# Patient Record
Sex: FEMALE | Race: ASIAN | Employment: FULL TIME | URBAN - METROPOLITAN AREA
[De-identification: names, ages, dates, MRNs, and addresses within clinical notes are randomized per-mention and may not be internally consistent; named-entity substitution may affect disease eponyms.]

---

## 2018-05-23 ENCOUNTER — OFFICE VISIT (OUTPATIENT)
Dept: HEMATOLOGY | Age: 48
End: 2018-05-23

## 2018-05-23 ENCOUNTER — HOSPITAL ENCOUNTER (OUTPATIENT)
Dept: LAB | Age: 48
Discharge: HOME | End: 2018-05-23

## 2018-05-23 VITALS
SYSTOLIC BLOOD PRESSURE: 93 MMHG | TEMPERATURE: 98.6 F | DIASTOLIC BLOOD PRESSURE: 59 MMHG | HEART RATE: 75 BPM | OXYGEN SATURATION: 98 % | WEIGHT: 115.25 LBS | BODY MASS INDEX: 19.68 KG/M2 | HEIGHT: 64 IN

## 2018-05-23 DIAGNOSIS — B18.1 CHRONIC HEPATITIS B (HCC): Primary | ICD-10-CM

## 2018-05-23 LAB — SENTARA SPECIMEN COL,SENBCF: NORMAL

## 2018-05-23 PROCEDURE — 99001 SPECIMEN HANDLING PT-LAB: CPT | Performed by: INTERNAL MEDICINE

## 2018-05-23 RX ORDER — MOMETASONE FUROATE 50 UG/1
2 SPRAY, METERED NASAL DAILY
COMMUNITY

## 2018-05-23 RX ORDER — FLUTICASONE PROPIONATE AND SALMETEROL 100; 50 UG/1; UG/1
1 POWDER RESPIRATORY (INHALATION) EVERY 12 HOURS
COMMUNITY

## 2018-05-23 NOTE — MR AVS SNAPSHOT
[address] [address] 
[phone] Patient: Ken Nance MRN: [id number] YPD:8[birth year] Visit Information Date & Time Provider Department Dept. Phone Encounter #  
 5/23/2018  2:00 PM [facility]  [address] [id number] Follow-up Instructions Return in about 2 weeks (around 6/6/2018) for MLS. Upcoming Health Maintenance Date Due DTaP/Tdap/Td series (1 - Tdap) 2/3/1992 PAP AKA CERVICAL CYTOLOGY 6/25/2014 Influenza Age 5 to Adult 8/1/2018 Allergies as of 5/23/2018  Review Complete On: 5/23/2018 By: Jennifer Kurtz LPN Not on File Current Immunizations  Never Reviewed No immunizations on file. Not reviewed this visit You Were Diagnosed With   
  
 Codes Comments [facility] hepatitis B ([facility]    -  Primary ICD-10-CM: B18.1 ICD-9-CM: 070.32 Vitals BP Pulse Temp Height(growth percentile) Weight(growth percentile) SpO2  
 93/59 75 98.6 °F (37 °C) (Tympanic) 5' 4\" (1.626 m) 115 lb 4 oz (52.3 kg) 98% BMI Smoking Status 19.78 kg/m2 Never Smoker BMI and BSA Data Body Mass Index Body Surface Area 19.78 kg/m 2 1.54 m 2 Your Updated Medication List  
  
   
This list is accurate as of 5/23/18  3:21 PM.  Always use your most recent med list.  
  
  
  
  
 ADVAIR DISKUS 100-50 mcg/dose diskus inhaler Generic drug:  fluticasone-salmeterol Take 1 Puff by inhalation every twelve (12) hours. NASONEX 50 mcg/actuation nasal spray Generic drug:  mometasone 2 Sprays daily. We Performed the Following CBC WITH AUTOMATED DIFF [16770 CPT(R)] HCV AB W/REFLEX VERIFICATION [[id number] Custom] HEP A AB, TOTAL O081090 CPT(R)] HEP B SURFACE AG [id number] CPT(R)] HEPATIC FUNCTION PANEL [71727 CPT(R)] HEPATITIS B, QT, PCR [35527 CPT(R)] HEPATITIS BE AB [01678 CPT(R)] HEPATITIS BE AG [42444 CPT(R)] METABOLIC PANEL, BASIC [63601 CPT(R)] Follow-up Instructions Return in about 2 weeks (around 6/6/2018) for MLS. To-Do List   
 05/23/2018 Imaging:  US ABD LTD W ELASTOGRAPHY Introducing hospitals & HEALTH SERVICES! [facility] introduces [employer] patient portal. Now you can access parts of your medical record, email your doctor's office, and request medication refills online. 1. In your internet browser, go to [email or web address] 2. Click on the First Time User? Click Here link in the Sign In box. You will see the New Member Sign Up page. 3. Enter your [employer] Access Code exactly as it appears below. You will not need to use this code after youve completed the sign-up process. If you do not sign up before the expiration date, you must request a new code. · [employer] Access Code: [email or web address] Expires: 8/21/2018  3:21 PM 
 
4. Enter the last four digits of your Social Security Number (xxxx) and Date of Birth (mm/dd/yyyy) as indicated and click Submit. You will be taken to the next sign-up page. 5. Create a [employer] ID. This will be your [employer] login ID and cannot be changed, so think of one that is secure and easy to remember. 6. Create a [employer] password. You can change your password at any time. 7. Enter your Password Reset Question and Answer. This can be used at a later time if you forget your password. 8. Enter your e-mail address. You will receive e-mail notification when new information is available in [address] 9. Click Sign Up. You can now view and download portions of your medical record. 10. Click the Download Summary menu link to download a portable copy of your medical information. If you have questions, please visit the Frequently Asked Questions section of the [employer] website. Remember, [employer] is NOT to be used for urgent needs. For medical emergencies, dial 911. Now available from your iPhone and Android! Please provide this summary of care documentation to your next provider. Your primary care clinician is listed as Steve Kim. If you have any questions after today's visit, please call [phone].

## 2018-05-23 NOTE — PROGRESS NOTES
70 Tawanda Salmon MD, FACP, [place], [place]       Sami Ku, TORRIE Pearson, SEYMOURP-ESTEFANI Crump, ANDREA Mathur [facility] [address]    at [address], [address] Luis Manuel Richey  22.    [phone]    FAX: [address] [place] [address], [address]    [phone]    FAX: [phone]         Patient Care Team:  Virginia Crooks MD as PCP - General (Family Practice)  Garett Bearden NP. Fax [phone]      Problem List  Date Reviewed: 5/23/2018          Codes Class Noted    Chronic hepatitis B [facility]) ICD-10-CM: B18.1  ICD-9-CM: 070.32  5/23/2018              The clinicians listed above have asked me to see Brendon Ramandeepming in consultation regarding chronic HBV and its management. All medical records sent by the referring physicians were reviewed   including imaging studies     The patient is a 53y.o. year old  female who has tested positive for HBV while in the 1990s before she immigrate to the [place] from [facility] in 2005. Risk factors for acquiring HBV are immigration from [facility]. There was no history of acute icteric hepatitis     Imaging of the liver was not performed. An assessment of liver fibrosis with biopsy or elastography has not been performed. The patient has never received treatment for chronic HBV. The patient has no symptoms which can be attributed to the liver disorder. The patient has not experienced fatigue, pain in the right side over the liver,     The patient completes all daily activities without any functional limitations. All of the issues listed in the Assessment and Plan were discussed with the patient. All questions were answered. The patient expressed a clear understanding of the above. [facility] 87 in 4 weeks to review all data and determine the treatment plan. ASSESSMENT AND PLAN:  Chronic HBV  It has not yet been determined if this is active or inactive HBV. The degree of fibrosis has not yet been determined. Liver transaminases are elevated. Liver function is normal.  The platelet count is normal.    The level of HBV DNA has not yet been determined. The patient is not currently receiving treatment for HBV. Will perform laboratory testing to monitor liver function and degree of liver injury. This will include BMP, hepatic panel, CBC with platelet count,   Will perform serologic and virologic testing of HBV to assess need for treatment, type of treatment, likelihood of responding to treatment and the duration of therapy. Will perform serologic and virologic studies to assess for other causes of chronic liver disease. Will perform imaging of the liver with ultrasound. The need to assess liver fibrosis was discussed. Sheer wave elastography can assess liver fibrosis and determine if a patient has advanced fibrosis or cirrhosis without the need for liver biopsy. Sheer wave elastography is now available at [facility] [employer]. This will be scheduled. Treatment of other medical problems in patients with chronic liver disease  The patient was directed to continue all current medications at the current dosages. There are no contraindications for the patient to take any medications that are necessary for treatment of other medical issues. The patient does not consume alcohol daily. Normal doses of acetaminophen can be used for pain as needed. Normal doses of acetaminophen as recommended on the label are not hepatotoxic, even in patients with cirrhosis. The patient does not have cirrhosis. Normal doses of NSAIDs can be used for pain as needed.     Counseling for alcohol in patients with chronic liver disease  The patient was counseled regarding alcohol consumption and the effect of alcohol on chronic liver disease. The patient does not consume any significant amount of alcohol. Vaccinations   The need for vaccination against viral hepatitis A will be assessed with serologic and instituted as appropriate. Routine vaccinations against other bacterial and viral agents can be performed as indicated. Annual flu vaccination should be administered if indicated. Screening for Hepatocellular Carcinoma  HCC screening in patients with HBV should be performed on an annual basis when patients are under the age of 48 years. Over the age of 48 years [facility]. screening with ultrasound should be performed every 6 months. [facility]. screening has not been not been performed in many years. AFP was ordered today and ultrasound will be scheduled. ALLERGIES  No Known Allergies    MEDICATIONS  Current Outpatient Prescriptions   Medication Sig    tenofovir ALAFENAMIDE FUMARATE (VEMLIDY) 25 mg tab Take 25 mg by mouth daily.  mometasone (NASONEX) 50 mcg/actuation nasal spray 2 Sprays daily.  fluticasone-salmeterol (ADVAIR DISKUS) 100-50 mcg/dose diskus inhaler Take 1 Puff by inhalation every twelve (12) hours. No current facility-administered medications for this visit. SYSTEM REVIEW NOT RELATED TO LIVER DISEASE OR REVIEWED ABOVE:  Constitution systems: Negative for fever, chills, weight gain, weight loss. Eyes: Negative for visual changes. ENT: Negative for sore throat, painful swallowing. Respiratory: Negative for cough, hemoptysis, SOB. Cardiology: Negative for chest pain, palpitations. GI:  Negative for constipation or diarrhea. : Negative for urinary frequency, dysuria, hematuria, nocturia. Skin: Negative for rash. Hematology: Negative for easy bruising, blood clots. Musculo-skelatal: Negative for back pain, muscle pain, weakness.   Neurologic: Negative for headaches, dizziness, vertigo, memory problems not related to HE. Psychology: Negative for anxiety, depression. FAMILY HISTORY:  The father is alive and healthy. The mother is Has the following chronic diseases: HBV. SOCIAL HISTORY:  The patient is . The spouse has been checked for HBV negative. The patient has 2 children,   The children have been vaccinated for HBV. The patient has never used tobacco products. The patient has never consumed significant amounts of alcohol. The patient currently works full time in [employer]. PHYSICAL EXAMINATION:  Visit Vitals    BP 93/59    Pulse 75    Temp 98.6 °F (37 °C) (Tympanic)    Ht 5' 4\" (1.626 m)    Wt 115 lb 4 oz (52.3 kg)    SpO2 98%    BMI 19.78 kg/m2     General: No acute distress. Eyes: Sclera anicteric. ENT: No oral lesions. Thyroid normal.  Nodes: No adenopathy. Skin: No spider angiomata. No jaundice. No palmar erythema. Respiratory: Lungs clear to auscultation. Cardiovascular: Regular heart rate. No murmurs. No JVD. Abdomen: Soft non-tender. Liver size normal to percussion/palpation. Spleen not palpable. No obvious ascites. Extremities: No edema. No muscle wasting. No gross arthritic changes. Neurologic: Alert and oriented. Cranial nerves grossly intact. No asterixis.     LABORATORY STUDIES:  From 2018  AST/ALT/ALP/T Bili/ALB:  126/235/91/0.9    SEROLOGIES:  HBsurface antigen positive    LIVER HISTOLOGY:  Not available or performed    ENDOSCOPIC PROCEDURES:  Not available or performed    RADIOLOGY:  Not available or performed    OTHER TESTING:  Not available or performed        Seng Bowers MD  [facility] 13 of [address] of [facility]  4 [facility], [address] [place] [address], [address]  [phone]

## 2018-05-24 LAB
A-G RATIO,AGRAT: 1.6 RATIO (ref 1.1–2.6)
ABSOLUTE LYMPHOCYTE COUNT, 10803: 2.6 K/UL (ref 1–4.8)
ALBUMIN SERPL-MCNC: 4.4 G/DL (ref 3.5–5)
ALP SERPL-CCNC: 68 U/L (ref 25–115)
ALT SERPL-CCNC: 74 U/L (ref 5–40)
ANION GAP SERPL CALC-SCNC: 15 MMOL/L
AST SERPL W P-5'-P-CCNC: 45 U/L (ref 10–37)
BASOPHILS # BLD: 0 K/UL (ref 0–0.2)
BASOPHILS NFR BLD: 0 % (ref 0–2)
BILIRUB SERPL-MCNC: 0.5 MG/DL (ref 0.2–1.2)
BILIRUBIN, DIRECT,CBIL: <0.2 MG/DL (ref 0–0.3)
BUN SERPL-MCNC: 14 MG/DL (ref 6–22)
CALCIUM SERPL-MCNC: 9.5 MG/DL (ref 8.4–10.5)
CHLORIDE SERPL-SCNC: 101 MMOL/L (ref 98–110)
CO2 SERPL-SCNC: 25 MMOL/L (ref 20–32)
CREAT SERPL-MCNC: 0.5 MG/DL (ref 0.5–1.2)
EOSINOPHIL # BLD: 0.5 K/UL (ref 0–0.5)
EOSINOPHIL NFR BLD: 8 % (ref 0–6)
ERYTHROCYTE [DISTWIDTH] IN BLOOD BY AUTOMATED COUNT: 13.4 % (ref 10–15.5)
GFRAA, 66117: >60
GFRNA, 66118: >60
GLOBULIN,GLOB: 2.8 G/DL (ref 2–4)
GLUCOSE SERPL-MCNC: 78 MG/DL (ref 70–99)
GRANULOCYTES,GRANS: 47 % (ref 40–75)
HCT VFR BLD AUTO: 42.2 % (ref 35.1–48)
HCV AB SER IA-ACNC: NORMAL
HGB BLD-MCNC: 13.7 G/DL (ref 11.7–16)
LYMPHOCYTES, LYMLT: 39 % (ref 20–45)
MCH RBC QN AUTO: 32 PG (ref 26–34)
MCHC RBC AUTO-ENTMCNC: 33 G/DL (ref 31–36)
MCV RBC AUTO: 97 FL (ref 80–95)
MONOCYTES # BLD: 0.4 K/UL (ref 0.1–1)
MONOCYTES NFR BLD: 7 % (ref 3–12)
NEUTROPHILS # BLD AUTO: 3.1 K/UL (ref 1.8–7.7)
PLATELET # BLD AUTO: 263 K/UL (ref 140–440)
PMV BLD AUTO: 10.8 FL (ref 9–13)
POTASSIUM SERPL-SCNC: 3.9 MMOL/L (ref 3.5–5.5)
PROT SERPL-MCNC: 7.2 G/DL (ref 6.4–8.3)
RBC # BLD AUTO: 4.35 M/UL (ref 3.8–5.2)
SODIUM SERPL-SCNC: 141 MMOL/L (ref 133–145)
WBC # BLD AUTO: 6.7 K/UL (ref 4–11)

## 2018-05-25 LAB
HEP A AB, TOTAL, 006726: POSITIVE
HEP B SURFACE AG SCRN, 006510: DETECTED
HEPATITIS BE AB, 006635: NEGATIVE
HEPATITIS BE AG, 006619: POSITIVE

## 2018-05-29 LAB — HBV-PCR QN, 20501: ABNORMAL IU/ML

## 2018-06-09 ENCOUNTER — HOSPITAL ENCOUNTER (OUTPATIENT)
Dept: ULTRASOUND IMAGING | Age: 48
Discharge: HOME | End: 2018-06-09
Attending: INTERNAL MEDICINE
Payer: COMMERCIAL

## 2018-06-09 DIAGNOSIS — B18.1 CHRONIC HEPATITIS B (HCC): ICD-10-CM

## 2018-06-09 PROCEDURE — 0346T US ABD LTD W ELASTOGRAPHY: CPT

## 2018-06-13 ENCOUNTER — OFFICE VISIT (OUTPATIENT)
Dept: HEMATOLOGY | Age: 48
End: 2018-06-13

## 2018-06-13 ENCOUNTER — TELEPHONE (OUTPATIENT)
Dept: HEMATOLOGY | Age: 48
End: 2018-06-13

## 2018-06-13 VITALS
RESPIRATION RATE: 16 BRPM | TEMPERATURE: 98.1 F | WEIGHT: 115 LBS | HEART RATE: 79 BPM | DIASTOLIC BLOOD PRESSURE: 52 MMHG | SYSTOLIC BLOOD PRESSURE: 101 MMHG | OXYGEN SATURATION: 94 % | HEIGHT: 64 IN | BODY MASS INDEX: 19.63 KG/M2

## 2018-06-13 DIAGNOSIS — B18.1 CHRONIC HEPATITIS B (HCC): Primary | ICD-10-CM

## 2018-06-13 NOTE — PROGRESS NOTES
Benjamin Lozada is a 53 y.o. female      1. Have you been to the ER, urgent care clinic or hospitalized since your last visit? NO.     2. Have you seen or consulted any other health care providers outside of the [address] [place] [address] since your last visit (Include any pap smears or colon screening)?  NO            Learning Assessment 6/13/2018   PRIMARY LEARNER Patient   BARRIERS PRIMARY LEARNER NONE   CO-LEARNER CAREGIVER No   PRIMARY LANGUAGE ENGLISH   LEARNER PREFERENCE PRIMARY LISTENING   ANSWERED BY PATIENT   RELATIONSHIP SELF

## 2018-06-13 NOTE — MR AVS SNAPSHOT
[address] [address] 
[phone] Patient: Ken Nance MRN: [id number] YEUNG:[birth year] Visit Information Date & Time Provider Department Dept. Phone Encounter #  
 6/13/2018  1:45 PM MD Kuldeep Nielson[address]  Follow-up Instructions Return in about 4 weeks (around 7/11/2018) for NP. Upcoming Health Maintenance Date Due Pneumococcal 19-64 Medium Risk (1 of 1 - PPSV23) 2/3/1990 DTaP/Tdap/Td series (1 - Tdap) 2/3/1992 PAP AKA CERVICAL CYTOLOGY 6/25/2014 Influenza Age 5 to Adult 8/1/2018 Allergies as of 6/13/2018  Review Complete On: 6/13/2018 By: Saul Cole No Known Allergies Current Immunizations  Never Reviewed No immunizations on file. Not reviewed this visit Vitals BP Pulse Temp Resp Height(growth percentile) Weight(growth percentile) 101/52 (BP 1 Location: Right arm, BP Patient Position: Sitting) 79 98.1 °F (36.7 °C) (Tympanic) 16 5' 4\" (1.626 m) 115 lb (52.2 kg) LMP SpO2 BMI OB Status Smoking Status 06/09/2018 94% 19.74 kg/m2 Having regular periods Never Smoker BMI and BSA Data Body Mass Index Body Surface Area 19.74 kg/m 2 1.54 m 2 Preferred Pharmacy Pharmacy Name Phone [address] [address] [phone] Your Updated Medication List  
  
   
This list is accurate as of 6/13/18  2:36 PM.  Always use your most recent med list.  
  
  
  
  
 ADVAIR DISKUS 100-50 mcg/dose diskus inhaler Generic drug:  fluticasone-salmeterol Take 1 Puff by inhalation every twelve (12) hours. NASONEX 50 mcg/actuation nasal spray Generic drug:  mometasone 2 Sprays daily. tenofovir ALAFENAMIDE FUMARATE 25 mg Tab Commonly known as:  VEMLIDY Take 25 mg by mouth daily. Prescriptions Sent to Pharmacy Refills  
 tenofovir ALAFENAMIDE FUMARATE (VEMLIDY) 25 mg tab 3 Sig: Take 25 mg by mouth daily. Class: Normal  
 Pharmacy: [facility] DR WALESKA BOLAÑOS [address] [place], [place] [address]Jaylene KESSLER  #: [phone] Route: Oral  
  
Follow-up Instructions Return in about 4 weeks (around 7/11/2018) for NP. Introducing [facility] & HEALTH SERVICES! Kin[facility] Donohue introduces [employer] patient portal. Now you can access parts of your medical record, email your doctor's office, and request medication refills online. 1. In your internet browser, go to [email or web address] 2. Click on the First Time User? Click Here link in the Sign In box. You will see the New Member Sign Up page. 3. Enter your [employer] Access Code exactly as it appears below. You will not need to use this code after youve completed the sign-up process. If you do not sign up before the expiration date, you must request a new code. · [employer] Access Code: [email or web address] Expires: 8/21/2018  3:21 PM 
 
4. Enter the last four digits of your Social Security Number (xxxx) and Date of Birth (mm/dd/yyyy) as indicated and click Submit. You will be taken to the next sign-up page. 5. Create a [employer] ID. This will be your [employer] login ID and cannot be changed, so think of one that is secure and easy to remember. 6. Create a [employer] password. You can change your password at any time. 7. Enter your Password Reset Question and Answer. This can be used at a later time if you forget your password. 8. Enter your e-mail address. You will receive e-mail notification when new information is available in [address] 9. Click Sign Up. You can now view and download portions of your medical record. 10. Click the Download Summary menu link to download a portable copy of your medical information.  
 
If you have questions, please visit the Frequently Asked Questions section of the [employer]. Remember, [employer] is NOT to be used for urgent needs. For medical emergencies, dial 911. Now available from your iPhone and Android! Please provide this summary of care documentation to your next provider. Your primary care clinician is listed as Kathryn Flores. If you have any questions after today's visit, please call [phone].

## 2018-06-13 NOTE — TELEPHONE ENCOUNTER
Patient called stating that the medication that she was prescribed today by Dr. David Lopez is too expensive even when paired with the discount card. Told the patient that I will send a message to Dr. Alexandra Castillo nurse. Patient confirmed understanding.

## 2018-06-14 ENCOUNTER — TELEPHONE (OUTPATIENT)
Dept: HEMATOLOGY | Age: 48
End: 2018-06-14

## 2018-06-14 NOTE — TELEPHONE ENCOUNTER
Returned pts call re [place]. Informed pt I contacted her insurance company and they informed myself that the medication is covered at their specialty pharmacy. Script sent to [facility]. Pt stated she will call the in the am to inquire about shipping.

## 2018-06-14 NOTE — TELEPHONE ENCOUNTER
Patient states Freedjuan Cardinal is too expensive even w/ discount and would like another medication or help to lower cost

## 2018-06-14 NOTE — PROGRESS NOTES
70 Tawanda Salmon MD, FACP, Cite Rachel Goetz, [place] March Shankar, NP    Sil Berry, TORRIE Solis, [facility]   ANDREA Carey NP [facility] 136    at [address], [address] [address] R[address]  22.    [phone]    FAX: 126 \[facility] [place] [facility]\""    at [address], [address] [facility], [address]    [phone]    FAX: [phone]         Patient Care Team:  Yanelis Sutton MD as PCP - General (Family Practice)  John Ortiz NP. Fax [phone]      Problem List  Date Reviewed: 6/13/2018          Codes Class Noted    Chronic hepatitis B [facility]) ICD-10-CM: B18.1  ICD-9-CM: 070.32  5/23/2018              Isha Campa returns to the [facility] [place] for management of chronic HBV. The active problem list, all pertinent past medical history, medications, radiologic findings and laboratory findings related to the liver disorder were reviewed with the patient. The patient is a 53y.o. year old  female who has tested positive for HBV while in the 1990s before she immigrate to the [place] from [facility] in 2005. She has E-antigen positive active HBV DNA with HBV DNA level of 116,000 intU. The most recent imaging of the liver was Ultrasound performed in 5/2018. Results suggest chronic liver disease. Assessment of liver fibrosis was performed with sheer wave elastogrphy at THE [facility] in 5/2018. The result was 7.6 kPa which correlates with mild-moderate fibrosis. The patient has never received treatment for chronic HBV. She will be started on Vemlidy. The patient has no symptoms which can be attributed to the liver disorder.     The patient has not experienced fatigue, pain in the right side over the liver, The patient completes all daily activities without any functional limitations. All of the issues listed in the Assessment and Plan were discussed with the patient. All questions were answered. The patient expressed a clear understanding of the above. [address] in 4 weeks to assess response to therapy. ASSESSMENT AND PLAN:  Chronic HBV  She has E-antigen positive chronic active HBV. There is stage 1-2 fibrosis by elastography. Liver transaminases are elevated. Liver function is normal.  The platelet count is normal.    The level of HBV DNA is 116,000 intU  She will be started on Vemlidy 25 mg every day. Treatment of other medical problems in patients with chronic liver disease  The patient was directed to continue all current medications at the current dosages. There are no contraindications for the patient to take any medications that are necessary for treatment of other medical issues. The patient does not consume alcohol daily. Normal doses of acetaminophen can be used for pain as needed. Normal doses of acetaminophen as recommended on the label are not hepatotoxic, even in patients with cirrhosis. The patient does not have cirrhosis. Normal doses of NSAIDs can be used for pain as needed. Counseling for alcohol in patients with chronic liver disease  The patient was counseled regarding alcohol consumption and the effect of alcohol on chronic liver disease. The patient does not consume any significant amount of alcohol. Vaccinations   Vaccination for viral hepatitis A is not needed. The patient has serologic evidence of prior exposure or vaccination with immunity. Routine vaccinations against other bacterial and viral agents can be performed as indicated. Annual flu vaccination should be administered if indicated.     Screening for Hepatocellular Carcinoma  HCC screening in patients with HBV should be performed on an annual basis when patients are under the age of 48 years. Over the age of 48 years [facility] [address] screening with ultrasound should be performed every 6 months. [facility] screening has not been not been performed in many years. AFP was ordered today and ultrasound will be scheduled. ALLERGIES  No Known Allergies    MEDICATIONS  Current Outpatient Prescriptions   Medication Sig    tenofovir ALAFENAMIDE FUMARATE (VEMLIDY) 25 mg tab Take 25 mg by mouth daily.  mometasone (NASONEX) 50 mcg/actuation nasal spray 2 Sprays daily.  fluticasone-salmeterol (ADVAIR DISKUS) 100-50 mcg/dose diskus inhaler Take 1 Puff by inhalation every twelve (12) hours. No current facility-administered medications for this visit. SYSTEM REVIEW NOT RELATED TO LIVER DISEASE OR REVIEWED ABOVE:  Constitution systems: Negative for fever, chills, weight gain, weight loss. Eyes: Negative for visual changes. ENT: Negative for sore throat, painful swallowing. Respiratory: Negative for cough, hemoptysis, SOB. Cardiology: Negative for chest pain, palpitations. GI:  Negative for constipation or diarrhea. : Negative for urinary frequency, dysuria, hematuria, nocturia. Skin: Negative for rash. Hematology: Negative for easy bruising, blood clots. Musculo-skelatal: Negative for back pain, muscle pain, weakness. Neurologic: Negative for headaches, dizziness, vertigo, memory problems not related to HE. Psychology: Negative for anxiety, depression. FAMILY HISTORY:  The father is alive and healthy. The mother is Has the following chronic diseases: HBV. SOCIAL HISTORY:  The patient is . The spouse has been checked for HBV negative. The patient has 2 children,   The children have been vaccinated for HBV. The patient has never used tobacco products. The patient has never consumed significant amounts of alcohol. The patient currently works full time in [place].         PHYSICAL EXAMINATION:  Visit Vitals    /52 (BP 1 Location: Right arm, BP Patient Position: Sitting)    Pulse 79    Temp 98.1 °F (36.7 °C) (Tympanic)    Resp 16    Ht 5' 4\" (1.626 m)    Wt 115 lb (52.2 kg)    LMP 06/09/2018    SpO2 94%    BMI 19.74 kg/m2     General: No acute distress. Eyes: Sclera anicteric. ENT: No oral lesions. Thyroid normal.  Nodes: No adenopathy. Skin: No spider angiomata. No jaundice. No palmar erythema. Respiratory: Lungs clear to auscultation. Cardiovascular: Regular heart rate. No murmurs. No JVD. Abdomen: Soft non-tender. Liver size normal to percussion/palpation. Spleen not palpable. No obvious ascites. Extremities: No edema. No muscle wasting. No gross arthritic changes. Neurologic: Alert and oriented. Cranial nerves grossly intact. No asterixis. LABORATORY STUDIES:  Liver [place] [address] & Units 5/23/2018   WBC 4.0 - 11.0 K/uL 6.7   ANC 1.8 - 7.7 K/uL 3.1   HGB 11.7 - 16.0 g/dL 13.7    - 440 K/uL 263   AST 10 - 37 U/L 45 (H)   ALT 5 - 40 U/L 74 (H)   Alk Phos 25 - 115 U/L 68   Bili, Total 0.2 - 1.2 mg/dL 0.5   Bili, Direct 0.0 - 0.3 mg/dL <0.2   Albumin 3.5 - 5.0 g/dL 4.4   BUN 6 - 22 mg/dL 14   Creat 0.5 - 1.2 mg/dL 0.5   Na 133 - 145 mmol/L 141   K 3.5 - 5.5 mmol/L 3.9   Cl 98 - 110 mmol/L 101   CO2 20 - 32 mmol/L 25   Glucose 70 - 99 mg/dL 78     SEROLOGIES:  Serologies Latest Ref Rng & Units 5/23/2018   Hep A Ab, Total Negative Positive (A)   Hep B Surface Ag None Detec Detected (A)     5/2018. HBEantigen positive, anti-HBE negative, HBV ,000 intU, anti-HCV negative    LIVER HISTOLOGY:  5/2018. Sheer wave elastography performed at THE [facility]. E Range: 5.31-9.09 kPa, E Mean: 7.53 kPa, E Median: 7.71 kPa, E Std: 1.25 kPa, The results suggested a fibrosis level of F1-2. ENDOSCOPIC PROCEDURES:  Not available or performed    RADIOLOGY:  5/2018. Ultrasound of liver. Echogenic consistent with chronic liver disease. No liver mass lesions. No dilated bile ducts.   No ascites.     OTHER TESTING:  Not available or performed        MD Kevin Ball 13 of [facility]  4 [facility], [address] [place] [address] Stephanie Lozoya, [address]  [phone]

## 2018-06-14 NOTE — TELEPHONE ENCOUNTER
Returned pts call to inform her that I contacted insurance company re [facility] and they stated that it is covered but only at their specialty pharmacy. [facility]. Pt stated she will call them in the am to inquire about shipping.

## 2018-08-07 ENCOUNTER — OFFICE VISIT (OUTPATIENT)
Dept: HEMATOLOGY | Age: 48
End: 2018-08-07

## 2018-08-07 ENCOUNTER — HOSPITAL ENCOUNTER (OUTPATIENT)
Dept: LAB | Age: 48
Discharge: HOME | End: 2018-08-07

## 2018-08-07 VITALS
DIASTOLIC BLOOD PRESSURE: 71 MMHG | OXYGEN SATURATION: 95 % | WEIGHT: 117 LBS | BODY MASS INDEX: 19.97 KG/M2 | RESPIRATION RATE: 16 BRPM | HEIGHT: 64 IN | HEART RATE: 96 BPM | TEMPERATURE: 97 F | SYSTOLIC BLOOD PRESSURE: 92 MMHG

## 2018-08-07 DIAGNOSIS — B18.1 CHRONIC HEPATITIS B (HCC): Primary | ICD-10-CM

## 2018-08-07 LAB — SENTARA SPECIMEN COL,SENBCF: NORMAL

## 2018-08-07 PROCEDURE — 99001 SPECIMEN HANDLING PT-LAB: CPT | Performed by: NURSE PRACTITIONER

## 2018-08-07 NOTE — PROGRESS NOTES
1. Have you been to the ER, urgent care clinic since your last visit? Hospitalized since your last visit? No    2. Have you seen or consulted any other health care providers outside of the [facility] since your last visit? Include any pap smears or colon screening.  No

## 2018-08-07 NOTE — PROGRESS NOTES
70 Tawanda Salmon MD, FACP, Cite Gerda Sofy, [place]       Peggy Freeman NP    Chip Bi, TORRIE Ludwig, BannerP-BC   ANDREA Cassidy NP [address] [facility] [place] 136    at [address], [address] [facility]    [address] [facility], [facility].    [phone]    FAX: [address] [place] [address]    at [address], [address]    [phone]    FAX: [phone]         Patient Care Team:  Ginny Kumar MD as PCP - General (Family Practice)  Lena Murray NP. Fax [phone]      Problem List  Date Reviewed: 6/13/2018          Codes Class Noted    Chronic hepatitis B [facility]) ICD-10-CM: B18.1  ICD-9-CM: 070.32  5/23/2018              Wald[facility]ar [facility] for management of chronic HBV. The active problem list, all pertinent past medical history, medications, radiologic findings and laboratory findings related to the liver disorder were reviewed with the patient. The patient is a 53y.o. year old  female who has tested positive for HBV while in the 1990s before she immigrate to the [place] from [facility] in 2005. She has E-antigen positive active HBV DNA with HBV DNA level of 116,000 intU. The most recent imaging of the liver was Ultrasound performed in 5/2018. Results suggest chronic liver disease. Assessment of liver fibrosis was performed with sheer wave elastogrphy at THE [facility] in 5/2018. The result was 7.6 kPa which correlates with mild-moderate fibrosis. The patient has never received treatment for chronic HBV. She was started on [facility] 6/2018 and is tolerating the medication. The patient has no symptoms which can be attributed to the liver disorder.     The patient has not experienced fatigue, pain in the right side over the liver. The patient completes all daily activities without any functional limitations. ASSESSMENT AND PLAN:  Chronic HBV  She has E-antigen positive chronic active HBV. There is stage 1-2 fibrosis by elastography. Liver transaminases are elevated. Liver function is normal.  The platelet count is normal.    The level of HBV DNA is 116,000 intU  She was started on Vemlidy 25 mg daily. Treatment of other medical problems in patients with chronic liver disease  The patient was directed to continue all current medications at the current dosages. There are no contraindications for the patient to take any medications that are necessary for treatment of other medical issues. The patient does not consume alcohol daily. Normal doses of acetaminophen can be used for pain as needed. Normal doses of acetaminophen as recommended on the label are not hepatotoxic, even in patients with cirrhosis. The patient does not have cirrhosis. Normal doses of NSAIDs can be used for pain as needed. Counseling for alcohol in patients with chronic liver disease  The patient was counseled regarding alcohol consumption and the effect of alcohol on chronic liver disease. The patient does not consume any significant amount of alcohol. Vaccinations   Vaccination for viral hepatitis A is not needed. The patient has serologic evidence of prior exposure or vaccination with immunity. Routine vaccinations against other bacterial and viral agents can be performed as indicated. Annual flu vaccination should be administered if indicated. Screening for Hepatocellular Carcinoma  HCC screening in patients with HBV should be performed on an annual basis when patients are under the age of 48 years. Over the age of 48 years [address] screening with ultrasound should be performed every 6 months. [address] screening has recently been performed and does not suggest [address]   The next liver imaging study will be performed in 7/2019. AFP will be ordered. ALLERGIES  Allergies   Allergen Reactions    Aleve [Naproxen Sodium] Other (comments)    Tylenol [Acetaminophen] Other (comments)       MEDICATIONS  Current Outpatient Prescriptions   Medication Sig    tenofovir ALAFENAMIDE FUMARATE (VEMLIDY) 25 mg tab Take 25 mg by mouth daily.  mometasone (NASONEX) 50 mcg/actuation nasal spray 2 Sprays daily.  fluticasone-salmeterol (ADVAIR DISKUS) 100-50 mcg/dose diskus inhaler Take 1 Puff by inhalation every twelve (12) hours. No current facility-administered medications for this visit. SYSTEM REVIEW NOT RELATED TO LIVER DISEASE OR REVIEWED ABOVE:  Constitution systems: Negative for fever, chills, weight gain, weight loss. Eyes: Negative for visual changes. ENT: Negative for sore throat, painful swallowing. Respiratory: Negative for cough, hemoptysis, SOB. Cardiology: Negative for chest pain, palpitations. GI:  Negative for constipation or diarrhea. : Negative for urinary frequency, dysuria, hematuria, nocturia. Skin: Negative for rash. Hematology: Negative for easy bruising, blood clots. Musculo-skelatal: Negative for back pain, muscle pain, weakness. Neurologic: Negative for headaches, dizziness, vertigo, memory problems not related to HE. Psychology: Negative for anxiety, depression. FAMILY HISTORY:  The father is alive and healthy. The mother has the following chronic diseases: HBV. SOCIAL HISTORY:  The patient is . The spouse has been checked for HBV negative. The patient has 2 children,   The children have been vaccinated for HBV. The patient has never used tobacco products. The patient has never consumed significant amounts of alcohol. The patient currently works full time in [place].         PHYSICAL EXAMINATION:  Visit Vitals    BP 92/71 (BP 1 Location: Left arm, BP Patient Position: Sitting)    Pulse 96    Temp 97 °F (36.1 °C) (Tympanic)    Resp 16    Ht 5' 4\" (1.626 m)    Wt 117 lb (53.1 kg)    LMP 07/27/2018    SpO2 95%    BMI 20.08 kg/m2     General: No acute distress. Eyes: Sclera anicteric. ENT: No oral lesions. Thyroid normal.  Nodes: No adenopathy. Skin: No spider angiomata. No jaundice. No palmar erythema. Respiratory: Lungs clear to auscultation. Cardiovascular: Regular heart rate. No murmurs. No JVD. Abdomen: Soft non-tender. Liver size normal to percussion/palpation. Spleen not palpable. No obvious ascites. Extremities: No edema. No muscle wasting. No gross arthritic changes. Neurologic: Alert and oriented. Cranial nerves grossly intact. No asterixis. LABORATORY STUDIES:  Liver [place] of [address] & Units 8/7/2018 5/23/2018   WBC 4.0 - 11.0 K/uL 5.7 6.7   ANC 1.8 - 7.7 K/uL 3.1 3.1   HGB 11.7 - 16.0 g/dL 14.5 13.7    - 440 K/uL 238 263   AST 10 - 37 U/L 18 45 (H)   ALT 5 - 40 U/L 17 74 (H)   Alk Phos 25 - 115 U/L 52 68   Bili, Total 0.2 - 1.2 mg/dL 0.4 0.5   Bili, Direct 0.0 - 0.3 mg/dL <0.2 <0.2   Albumin 3.5 - 5.0 g/dL 4.3 4.4   BUN 6 - 22 mg/dL 19 14   Creat 0.5 - 1.2 mg/dL 0.4 (L) 0.5   Na 133 - 145 mmol/L 141 141   K 3.5 - 5.5 mmol/L 4.0 3.9   Cl 98 - 110 mmol/L 99 101   CO2 20 - 32 mmol/L 23 25   Glucose 70 - 99 mg/dL 84 78     SEROLOGIES:  Serologies Latest Ref Rng & Units 5/23/2018   Hep A Ab, Total Negative Positive (A)   Hep B Surface Ag None Detec Detected (A)     5/2018. HBEantigen positive, anti-HBE negative, HBV ,000 intU, anti-HCV negative    LIVER HISTOLOGY:  5/2018. Sheer wave elastography performed at THE [facility]. E Range: 5.31-9.09 kPa, E Mean: 7.53 kPa, E Median: 7.71 kPa, E Std: 1.25 kPa, The results suggested a fibrosis level of F1-2. ENDOSCOPIC PROCEDURES:  Not available or performed    RADIOLOGY:  5/2018. Ultrasound of liver. Echogenic consistent with chronic liver disease. No liver mass lesions. No dilated bile ducts. No ascites.     OTHER TESTING:  Not available or performed    FOLLOW UP:  All of the issues listed in the Assessment and Plan were discussed with the patient. All questions were answered. The patient expressed a clear understanding of the above. [address] [address] in 3 months for routine monitoring.        ANDREA Bah [address] of [place] [address]  [facility] Kaity Mendoza, [address] [place] [address] - [address]  [phone]

## 2018-08-07 NOTE — MR AVS SNAPSHOT
Merlin Laroche 
 
 
 [place] [address] 
[phone] Patient: Tobias Mcleod MRN: [id number] FHM:[id number] Visit Information Date & Time Provider Department Dept. Phone Encounter #  
 8/7/2018  2:00 PM ANDREA Campbell 13 of  [address] [id number] Follow-up Instructions Return in about 3 months (around 11/7/2018) for jc. Upcoming Health Maintenance Date Due Pneumococcal 19-64 Medium Risk (1 of 1 - PPSV23) 2/3/1990 DTaP/Tdap/Td series (1 - Tdap) 2/3/1992 PAP AKA CERVICAL CYTOLOGY 6/25/2014 Influenza Age 5 to Adult 8/1/2018 Allergies as of 8/7/2018  Review Complete On: 8/7/2018 By: Waldo Vizcaino Severity Noted Reaction Type Reactions Aleve [Naproxen Sodium]  08/07/2018    Other (comments) Tylenol [Acetaminophen]  08/07/2018    Other (comments) Current Immunizations  Never Reviewed No immunizations on file. Not reviewed this visit You Were Diagnosed With   
  
 Codes Comments Chronic hepatitis B ([facility]    -  Primary ICD-10-CM: B18.1 ICD-9-CM: 070.32 Vitals BP Pulse Temp Resp Height(growth percentile) Weight(growth percentile) 92/71 (BP 1 Location: Left arm, BP Patient Position: Sitting) 96 97 °F (36.1 °C) (Tympanic) 16 5' 4\" (1.626 m) 117 lb (53.1 kg) LMP SpO2 BMI OB Status Smoking Status 07/27/2018 95% 20.08 kg/m2 Having regular periods Never Smoker Vitals History BMI and BSA Data Body Mass Index Body Surface Area 20.08 kg/m 2 1.55 m 2 Preferred Pharmacy Pharmacy Name Phone [address] [phone] Your Updated Medication List  
  
   
This list is accurate as of 8/7/18  2:16 PM.  Always use your most recent med list.  
  
  
  
  
 ADVAIR DISKUS 100-50 mcg/dose diskus inhaler Generic drug:  fluticasone-salmeterol Take 1 Puff by inhalation every twelve (12) hours. NASONEX 50 mcg/actuation nasal spray Generic drug:  mometasone 2 Sprays daily. tenofovir ALAFENAMIDE FUMARATE 25 mg Tab Commonly known as:  VEMLIDY Take 25 mg by mouth daily. Follow-up Instructions Return in about 3 months (around 11/7/2018) for jc. To-Do List   
 08/07/2018 Lab:  CBC WITH AUTOMATED DIFF   
  
 08/07/2018 Lab:  HEPATIC FUNCTION PANEL   
  
 08/07/2018 Lab:  HEPATITIS B, QT, PCR   
  
 08/07/2018 Lab:  METABOLIC PANEL, BASIC Introducing [facility] [place] [facility]! [employer] introduces [employer]ush patient portal. Now you can access parts of your medical record, email your doctor's office, and request medication refills online. 1. In your internet browser, go to [email or web address] 2. Click on the First Time User? Click Here link in the Sign In box. You will see the New Member Sign Up page. 3. Enter your Krush Access Code exactly as it appears below. You will not need to use this code after youve completed the sign-up process. If you do not sign up before the expiration date, you must request a new code. · Krush Access Code: [email or web address] Expires: 8/21/2018  3:21 PM 
 
4. Enter the last four digits of your Social Security Number (xxxx) and Date of Birth (mm/dd/yyyy) as indicated and click Submit. You will be taken to the next sign-up page. 5. Create a A[employer]t ID. This will be your Krush login ID and cannot be changed, so think of one that is secure and easy to remember. 6. Create a Krush password. You can change your password at any time. 7. Enter your Password Reset Question and Answer. This can be used at a later time if you forget your password. 8. Enter your e-mail address. You will receive e-mail notification when new information is available in [address] 9. Click Sign Up. You can now view and download portions of your medical record. 10. Click the Download Summary menu link to download a portable copy of your medical information. If you have questions, please visit the Frequently Asked Questions section of the [employer] website. Remember, [employer]gen is NOT to be used for urgent needs. For medical emergencies, dial 911. Now available from your iPhone and Android! Please provide this summary of care documentation to your next provider. Your primary care clinician is listed as Ariadna Garcia. If you have any questions after today's visit, please call [phone].

## 2018-08-08 LAB
A-G RATIO,AGRAT: 1.7 RATIO (ref 1.1–2.6)
ABSOLUTE LYMPHOCYTE COUNT, 10803: 2.1 K/UL (ref 1–4.8)
ALBUMIN SERPL-MCNC: 4.3 G/DL (ref 3.5–5)
ALP SERPL-CCNC: 52 U/L (ref 25–115)
ALT SERPL-CCNC: 17 U/L (ref 5–40)
ANION GAP SERPL CALC-SCNC: 19 MMOL/L
AST SERPL W P-5'-P-CCNC: 18 U/L (ref 10–37)
BASOPHILS # BLD: 0 K/UL (ref 0–0.2)
BASOPHILS NFR BLD: 0 % (ref 0–2)
BILIRUB SERPL-MCNC: 0.4 MG/DL (ref 0.2–1.2)
BILIRUBIN, DIRECT,CBIL: <0.2 MG/DL (ref 0–0.3)
BUN SERPL-MCNC: 19 MG/DL (ref 6–22)
CALCIUM SERPL-MCNC: 9.3 MG/DL (ref 8.4–10.5)
CHLORIDE SERPL-SCNC: 99 MMOL/L (ref 98–110)
CO2 SERPL-SCNC: 23 MMOL/L (ref 20–32)
CREAT SERPL-MCNC: 0.4 MG/DL (ref 0.5–1.2)
EOSINOPHIL # BLD: 0.1 K/UL (ref 0–0.5)
EOSINOPHIL NFR BLD: 3 % (ref 0–6)
ERYTHROCYTE [DISTWIDTH] IN BLOOD BY AUTOMATED COUNT: 12.7 % (ref 10–15.5)
GFRAA, 66117: >60
GFRNA, 66118: >60
GLOBULIN,GLOB: 2.5 G/DL (ref 2–4)
GLUCOSE SERPL-MCNC: 84 MG/DL (ref 70–99)
GRANULOCYTES,GRANS: 55 % (ref 40–75)
HCT VFR BLD AUTO: 43.6 % (ref 35.1–48)
HGB BLD-MCNC: 14.5 G/DL (ref 11.7–16)
LYMPHOCYTES, LYMLT: 37 % (ref 20–45)
MCH RBC QN AUTO: 32 PG (ref 26–34)
MCHC RBC AUTO-ENTMCNC: 33 G/DL (ref 31–36)
MCV RBC AUTO: 97 FL (ref 80–95)
MONOCYTES # BLD: 0.3 K/UL (ref 0.1–1)
MONOCYTES NFR BLD: 5 % (ref 3–12)
NEUTROPHILS # BLD AUTO: 3.1 K/UL (ref 1.8–7.7)
PLATELET # BLD AUTO: 238 K/UL (ref 140–440)
PMV BLD AUTO: 10.6 FL (ref 9–13)
POTASSIUM SERPL-SCNC: 4 MMOL/L (ref 3.5–5.5)
PROT SERPL-MCNC: 6.8 G/DL (ref 6.4–8.3)
RBC # BLD AUTO: 4.52 M/UL (ref 3.8–5.2)
SODIUM SERPL-SCNC: 141 MMOL/L (ref 133–145)
WBC # BLD AUTO: 5.7 K/UL (ref 4–11)

## 2018-08-10 LAB
HBV LOG10: 2.26 {LOG_IU}/ML
HBV-PCR QN, 20501: 184 IU/ML

## 2018-08-13 NOTE — PROGRESS NOTES
HBV is down to 184 IU/mL. Liver enzymes have returned to normal. Continue Vemlidy. Follow up as scheduled.

## 2018-11-07 ENCOUNTER — HOSPITAL ENCOUNTER (OUTPATIENT)
Dept: LAB | Age: 48
Discharge: HOME | End: 2018-11-07

## 2018-11-07 ENCOUNTER — OFFICE VISIT (OUTPATIENT)
Dept: HEMATOLOGY | Age: 48
End: 2018-11-07

## 2018-11-07 VITALS
OXYGEN SATURATION: 98 % | HEART RATE: 74 BPM | HEIGHT: 64 IN | RESPIRATION RATE: 16 BRPM | DIASTOLIC BLOOD PRESSURE: 62 MMHG | TEMPERATURE: 98.5 F | WEIGHT: 120 LBS | SYSTOLIC BLOOD PRESSURE: 90 MMHG | BODY MASS INDEX: 20.49 KG/M2

## 2018-11-07 DIAGNOSIS — B18.1 CHRONIC HEPATITIS B (HCC): Primary | ICD-10-CM

## 2018-11-07 LAB — SENTARA SPECIMEN COL,SENBCF: NORMAL

## 2018-11-07 PROCEDURE — 99001 SPECIMEN HANDLING PT-LAB: CPT | Performed by: NURSE PRACTITIONER

## 2018-11-07 NOTE — PROGRESS NOTES
[address] [facility], [place], [address], [facility], [place]       Fidel Baron, NP Claudene Lobo, PA-C Burnett Peach, SPRING-BC   Wiley Hamman, NP Ignacia Fortes, NP [facility] [place] 136    at [address], [address] [address]  22.    [phone]    FAX: [address] [place] [address], [address]    [phone]    FAX: [phone]     Patient Care Team:  Bhavna Ledesma MD as PCP - General (Family Practice)  Willian Worley NP. Fax [phone]      Problem List  Date Reviewed: 8/9/2018          Codes Class Noted    Chronic hepatitis B [facility]) ICD-10-CM: B18.1  ICD-9-CM: 070.32  5/23/2018              Leelee Gordon returns to the [facility] for management of chronic HBV. The active problem list, all pertinent past medical history, medications, radiologic findings and laboratory findings related to the liver disorder were reviewed with the patient. The patient is a 53y.o. year old  female who has tested positive for HBV while in the 1990s before she immigrated to the [place] from [facility] in 2005. She has E-antigen positive active HBV DNA with HBV DNA level of 184  IU/mL now that she cote been on Vemlidy since 6/2018. The most recent imaging of the liver was Ultrasound performed in 6/2018. Results suggest chronic liver disease. Assessment of liver fibrosis was performed with sheer wave elastogrphy at THE [facility] in 6/2018. The result was 7.6 kPa which correlates with mild-moderate fibrosis. The patient has no symptoms which can be attributed to the liver disorder. The patient has not experienced fatigue, pain in the right side over the liver.      The patient completes all daily activities without any functional limitations. ASSESSMENT AND PLAN:  Chronic HBV  She has E-antigen positive chronic active HBV. There is stage 1-2 fibrosis by elastography. Liver transaminases are normal.  Liver function is normal.  The platelet count is normal.    She is taking Vemlidy 25 mg daily. Treatment of other medical problems in patients with chronic liver disease  The patient was directed to continue all current medications at the current dosages. There are no contraindications for the patient to take any medications that are necessary for treatment of other medical issues. The patient does not consume alcohol daily. Normal doses of acetaminophen can be used for pain as needed. Normal doses of acetaminophen as recommended on the label are not hepatotoxic, even in patients with cirrhosis. The patient does not have cirrhosis. Normal doses of NSAIDs can be used for pain as needed. Counseling for alcohol in patients with chronic liver disease  The patient was counseled regarding alcohol consumption and the effect of alcohol on chronic liver disease. The patient does not consume any significant amount of alcohol. Vaccinations   Vaccination for viral hepatitis A is not needed. The patient has serologic evidence of prior exposure or vaccination with immunity. Routine vaccinations against other bacterial and viral agents can be performed as indicated. Annual flu vaccination should be administered if indicated. Screening for Hepatocellular Carcinoma  HCC screening in patients with HBV should be performed on an annual basis when patients are under the age of 48 years. Over the age of 48 years [address] screening with ultrasound should be performed every 6 months. [address] screening has recently been performed and does not suggest [address] The next liver imaging study will be performed in 6/2019.     ALLERGIES  Allergies   Allergen Reactions    Aleve [Naproxen Sodium] Other (comments)    Tylenol [Acetaminophen] Other (comments) MEDICATIONS  Current Outpatient Medications   Medication Sig    tenofovir ALAFENAMIDE FUMARATE (VEMLIDY) 25 mg tab Take 25 mg by mouth daily.  mometasone (NASONEX) 50 mcg/actuation nasal spray 2 Sprays daily.  fluticasone-salmeterol (ADVAIR DISKUS) 100-50 mcg/dose diskus inhaler Take 1 Puff by inhalation every twelve (12) hours. No current facility-administered medications for this visit. SYSTEM REVIEW NOT RELATED TO LIVER DISEASE OR REVIEWED ABOVE:  Constitution systems: Negative for fever, chills, weight gain, weight loss. Eyes: Negative for visual changes. ENT: Negative for sore throat, painful swallowing. Respiratory: Negative for cough, hemoptysis, SOB. Cardiology: Negative for chest pain, palpitations. GI:  Negative for constipation or diarrhea. : Negative for urinary frequency, dysuria, hematuria, nocturia. Skin: Negative for rash. Hematology: Negative for easy bruising, blood clots. Musculo-skelatal: Negative for back pain, muscle pain, weakness. Neurologic: Negative for headaches, dizziness, vertigo, memory problems not related to HE. Psychology: Negative for anxiety, depression. FAMILY HISTORY:  The father is alive and healthy. The mother has the following chronic diseases: HBV. SOCIAL HISTORY:  The patient is . The spouse has been checked for HBV negative. The patient has 2 children,   The children have been vaccinated for HBV. The patient has never used tobacco products. The patient has never consumed significant amounts of alcohol. The patient currently works full time in [employer]. PHYSICAL EXAMINATION:  Visit Vitals  BP 90/62 (BP 1 Location: Right arm, BP Patient Position: Sitting)   Pulse 74   Temp 98.5 °F (36.9 °C) (Tympanic)   Resp 16   Ht 5' 4\" (1.626 m)   Wt 120 lb (54.4 kg)   SpO2 98%   BMI 20.60 kg/m²     General: No acute distress. Eyes: Sclera anicteric. ENT: No oral lesions.   Thyroid normal.  Nodes: No adenopathy. Skin: No spider angiomata. No jaundice. No palmar erythema. Respiratory: Lungs clear to auscultation. Cardiovascular: Regular heart rate. No murmurs. No JVD. Abdomen: Soft non-tender. Liver size normal to percussion/palpation. Spleen not palpable. No obvious ascites. Extremities: No edema. No muscle wasting. No gross arthritic changes. Neurologic: Alert and oriented. Cranial nerves grossly intact. No asterixis. LABORATORY STUDIES:  [facility] [address] & Units 8/7/2018 5/23/2018   WBC 4.0 - 11.0 K/uL 5.7 6.7   ANC 1.8 - 7.7 K/uL 3.1 3.1   HGB 11.7 - 16.0 g/dL 14.5 13.7    - 440 K/uL 238 263   AST 10 - 37 U/L 18 45 (H)   ALT 5 - 40 U/L 17 74 (H)   Alk Phos 25 - 115 U/L 52 68   Bili, Total 0.2 - 1.2 mg/dL 0.4 0.5   Bili, Direct 0.0 - 0.3 mg/dL <0.2 <0.2   Albumin 3.5 - 5.0 g/dL 4.3 4.4   BUN 6 - 22 mg/dL 19 14   Creat 0.5 - 1.2 mg/dL 0.4 (L) 0.5   Na 133 - 145 mmol/L 141 141   K 3.5 - 5.5 mmol/L 4.0 3.9   Cl 98 - 110 mmol/L 99 101   CO2 20 - 32 mmol/L 23 25   Glucose 70 - 99 mg/dL 84 78     SEROLOGIES:  Serologies Latest Ref Rng & Units 5/23/2018   Hep A Ab, Total Negative Positive (A)   Hep B Surface Ag None Detec Detected (A)     5/2018. HBEantigen positive, anti-HBE negative, HBV ,000 intU, anti-HCV negative    LIVER HISTOLOGY:  5/2018. Sheer wave elastography performed at THE [facility]. E Range: 5.31-9.09 kPa, E Mean: 7.53 kPa, E Median: 7.71 kPa, E Std: 1.25 kPa, The results suggested a fibrosis level of F1-2. ENDOSCOPIC PROCEDURES:  Not available or performed    RADIOLOGY:  5/2018. Ultrasound of liver. Echogenic consistent with chronic liver disease. No liver mass lesions. No dilated bile ducts. No ascites. OTHER TESTING:  Not available or performed    FOLLOW UP:  All of the issues listed in the Assessment and Plan were discussed with the patient. All questions were answered.   The patient expressed a clear understanding of the above.    [address] [address] in 6 months for routine monitoring of HBV.        Andrea Arguello, AGESE-BC  [address] Liver [place] [facility]1  4 [facility], [address] [place] [address] Stephanie Lozoya, [address] [phone]

## 2018-11-08 LAB
A-G RATIO,AGRAT: 1.7 RATIO (ref 1.1–2.6)
ABSOLUTE LYMPHOCYTE COUNT, 10803: 2.8 K/UL (ref 1–4.8)
ALBUMIN SERPL-MCNC: 4.5 G/DL (ref 3.5–5)
ALP SERPL-CCNC: 51 U/L (ref 25–115)
ALT SERPL-CCNC: 18 U/L (ref 5–40)
ANION GAP SERPL CALC-SCNC: 16 MMOL/L
AST SERPL W P-5'-P-CCNC: 20 U/L (ref 10–37)
BASOPHILS # BLD: 0 K/UL (ref 0–0.2)
BASOPHILS NFR BLD: 0 % (ref 0–2)
BILIRUB SERPL-MCNC: 0.3 MG/DL (ref 0.2–1.2)
BILIRUBIN, DIRECT,CBIL: <0.2 MG/DL (ref 0–0.3)
BUN SERPL-MCNC: 22 MG/DL (ref 6–22)
CALCIUM SERPL-MCNC: 9.3 MG/DL (ref 8.4–10.5)
CHLORIDE SERPL-SCNC: 98 MMOL/L (ref 98–110)
CO2 SERPL-SCNC: 26 MMOL/L (ref 20–32)
CREAT SERPL-MCNC: 0.7 MG/DL (ref 0.5–1.2)
EOSINOPHIL # BLD: 0.5 K/UL (ref 0–0.5)
EOSINOPHIL NFR BLD: 5 % (ref 0–6)
ERYTHROCYTE [DISTWIDTH] IN BLOOD BY AUTOMATED COUNT: 12.8 % (ref 10–15.5)
GFRAA, 66117: >60
GFRNA, 66118: >60
GLOBULIN,GLOB: 2.7 G/DL (ref 2–4)
GLUCOSE SERPL-MCNC: 51 MG/DL (ref 70–99)
GRANULOCYTES,GRANS: 56 % (ref 40–75)
HBV LOG10: <1 {LOG_IU}/ML
HBV-PCR QN, 20501: <10 IU/ML
HCT VFR BLD AUTO: 42.9 % (ref 35.1–48)
HGB BLD-MCNC: 14.2 G/DL (ref 11.7–16)
LYMPHOCYTES, LYMLT: 32 % (ref 20–45)
MCH RBC QN AUTO: 32 PG (ref 26–34)
MCHC RBC AUTO-ENTMCNC: 33 G/DL (ref 31–36)
MCV RBC AUTO: 96 FL (ref 80–95)
MONOCYTES # BLD: 0.5 K/UL (ref 0.1–1)
MONOCYTES NFR BLD: 6 % (ref 3–12)
NEUTROPHILS # BLD AUTO: 4.9 K/UL (ref 1.8–7.7)
PLATELET # BLD AUTO: 295 K/UL (ref 140–440)
PMV BLD AUTO: 10.3 FL (ref 9–13)
POTASSIUM SERPL-SCNC: 4 MMOL/L (ref 3.5–5.5)
PROT SERPL-MCNC: 7.2 G/DL (ref 6.4–8.3)
RBC # BLD AUTO: 4.46 M/UL (ref 3.8–5.2)
SODIUM SERPL-SCNC: 140 MMOL/L (ref 133–145)
WBC # BLD AUTO: 8.7 K/UL (ref 4–11)

## 2019-05-13 ENCOUNTER — HOSPITAL ENCOUNTER (OUTPATIENT)
Dept: LAB | Age: 49
Discharge: HOME | End: 2019-05-13

## 2019-05-13 DIAGNOSIS — B18.1 CHRONIC HEPATITIS B (HCC): Primary | ICD-10-CM

## 2019-05-13 LAB — SENTARA SPECIMEN COL,SENBCF: NORMAL

## 2019-05-13 PROCEDURE — 99001 SPECIMEN HANDLING PT-LAB: CPT

## 2019-05-14 LAB
A-G RATIO,AGRAT: 1.7 RATIO (ref 1.1–2.6)
ABSOLUTE LYMPHOCYTE COUNT, 10803: 1.7 K/UL (ref 1–4.8)
ALBUMIN SERPL-MCNC: 4.7 G/DL (ref 3.5–5)
ALP SERPL-CCNC: 50 U/L (ref 25–115)
ALT SERPL-CCNC: 16 U/L (ref 5–40)
ANION GAP SERPL CALC-SCNC: 12 MMOL/L
AST SERPL W P-5'-P-CCNC: 17 U/L (ref 10–37)
BASOPHILS # BLD: 0 K/UL (ref 0–0.2)
BASOPHILS NFR BLD: 0 % (ref 0–2)
BILIRUB SERPL-MCNC: 0.2 MG/DL (ref 0.2–1.2)
BILIRUBIN, DIRECT,CBIL: <0.2 MG/DL (ref 0–0.3)
BUN SERPL-MCNC: 12 MG/DL (ref 6–22)
CALCIUM SERPL-MCNC: 9.6 MG/DL (ref 8.4–10.5)
CHLORIDE SERPL-SCNC: 102 MMOL/L (ref 98–110)
CO2 SERPL-SCNC: 26 MMOL/L (ref 20–32)
CREAT SERPL-MCNC: 0.5 MG/DL (ref 0.5–1.2)
EOSINOPHIL # BLD: 0.3 K/UL (ref 0–0.5)
EOSINOPHIL NFR BLD: 5 % (ref 0–6)
ERYTHROCYTE [DISTWIDTH] IN BLOOD BY AUTOMATED COUNT: 13.1 % (ref 10–15.5)
GFRAA, 66117: >60
GFRNA, 66118: >60
GLOBULIN,GLOB: 2.7 G/DL (ref 2–4)
GLUCOSE SERPL-MCNC: 89 MG/DL (ref 70–99)
GRANULOCYTES,GRANS: 60 % (ref 40–75)
HCT VFR BLD AUTO: 42.7 % (ref 35.1–48)
HGB BLD-MCNC: 13.7 G/DL (ref 11.7–16)
LYMPHOCYTES, LYMLT: 29 % (ref 20–45)
MCH RBC QN AUTO: 31 PG (ref 26–34)
MCHC RBC AUTO-ENTMCNC: 32 G/DL (ref 31–36)
MCV RBC AUTO: 97 FL (ref 80–95)
MONOCYTES # BLD: 0.4 K/UL (ref 0.1–1)
MONOCYTES NFR BLD: 6 % (ref 3–12)
NEUTROPHILS # BLD AUTO: 3.5 K/UL (ref 1.8–7.7)
PLATELET # BLD AUTO: 265 K/UL (ref 140–440)
PMV BLD AUTO: 10.5 FL (ref 9–13)
POTASSIUM SERPL-SCNC: 3.8 MMOL/L (ref 3.5–5.5)
PROT SERPL-MCNC: 7.4 G/DL (ref 6.4–8.3)
RBC # BLD AUTO: 4.4 M/UL (ref 3.8–5.2)
SODIUM SERPL-SCNC: 140 MMOL/L (ref 133–145)
WBC # BLD AUTO: 5.9 K/UL (ref 4–11)

## 2019-05-15 LAB
AFP, SERUM, 141303: 2.6 NG/ML (ref 0–8)
AFP-L3%, SERUM, 141302: NORMAL % (ref 0–9.9)
HBV LOG10: 1.12 {LOG_IU}/ML
HBV-PCR QN, 20501: 13.2 IU/ML

## 2019-05-21 ENCOUNTER — OFFICE VISIT (OUTPATIENT)
Dept: HEMATOLOGY | Age: 49
End: 2019-05-21

## 2019-05-21 VITALS
DIASTOLIC BLOOD PRESSURE: 77 MMHG | WEIGHT: 118 LBS | TEMPERATURE: 97.7 F | SYSTOLIC BLOOD PRESSURE: 106 MMHG | HEART RATE: 75 BPM | OXYGEN SATURATION: 97 % | BODY MASS INDEX: 20.14 KG/M2 | HEIGHT: 64 IN

## 2019-05-21 DIAGNOSIS — B18.1 CHRONIC HEPATITIS B (HCC): Primary | ICD-10-CM

## 2019-05-21 NOTE — PROGRESS NOTES
[address] [facility], [place], [place], [address] [place], [place]       Naeem Castelan, TORRIE Garcia, ACNP-BC   Ten Stallings, ANDREA Hidalgo [facility] 136    at [address], [address] [address]    [phone]    FAX: [address], [address]    [phone]    FAX: [phone]     Patient Care Team:  Ezequiel Burton MD as PCP - General (Family Practice)  Adele Segovia NP. Fax [phone]      Problem List  Date Reviewed: 8/9/2018          Codes Class Noted    Chronic hepatitis B [facility]) ICD-10-CM: B18.1  ICD-9-CM: 070.32  5/23/2018              Steffany Morales returns to the [address] for management of chronic HBV. The active problem list, all pertinent past medical history, medications, radiologic findings and laboratory findings related to the liver disorder were reviewed with the patient. The patient is a 51y.o. year old  female who has tested positive for HBV while in the 1990s before she immigrated to the [place] from [facility] in 2005. She has E-antigen positive active HBV DNA with HBV DNA level of 184  IU/mL now that she has been on Vemlidy since 6/2018. The most recent imaging of the liver was Ultrasound performed in 6/2018. Results suggest chronic liver disease. Assessment of liver fibrosis was performed with sheer wave elastogrphy at THE [facility] in 6/2018. The result was 7.6 kPa which correlates with mild-moderate fibrosis. The patient has no symptoms which can be attributed to the liver disorder. The patient has not experienced fatigue, pain in the right side over the liver.      The patient completes all daily activities without any functional limitations. ASSESSMENT AND PLAN:  Chronic HBV  She has E-antigen positive chronic active HBV. There is stage 1-2 fibrosis by elastography. Liver transaminases are normal.  Liver function is normal.  The platelet count is normal.    She is taking Vemlidy 25 mg daily. Treatment of other medical problems in patients with chronic liver disease  The patient was directed to continue all current medications at the current dosages. There are no contraindications for the patient to take any medications that are necessary for treatment of other medical issues. The patient does not consume alcohol daily. Normal doses of acetaminophen can be used for pain as needed. Normal doses of acetaminophen as recommended on the label are not hepatotoxic, even in patients with cirrhosis. The patient does not have cirrhosis. Normal doses of NSAIDs can be used for pain as needed. Counseling for alcohol in patients with chronic liver disease  The patient was counseled regarding alcohol consumption and the effect of alcohol on chronic liver disease. The patient does not consume any significant amount of alcohol. Vaccinations   Vaccination for viral hepatitis A is not needed. The patient has serologic evidence of prior exposure or vaccination with immunity. Routine vaccinations against other bacterial and viral agents can be performed as indicated. Annual flu vaccination should be administered if indicated. Screening for Hepatocellular Carcinoma  HCC screening in patients with HBV should be performed on an annual basis when patients are under the age of 48 years. Over the age of 48 years [address] screening with ultrasound should be performed every 6 months. [address] screening has recently been performed and does not suggest [address] The next liver imaging study will be performed in 6/2019.     ALLERGIES  Allergies   Allergen Reactions    Aleve [Naproxen Sodium] Other (comments)    Tylenol [Acetaminophen] Other (comments) MEDICATIONS  Current Outpatient Medications   Medication Sig    tenofovir ALAFENAMIDE FUMARATE (VEMLIDY) 25 mg tab Take 25 mg by mouth daily.  mometasone (NASONEX) 50 mcg/actuation nasal spray 2 Sprays daily.  fluticasone-salmeterol (ADVAIR DISKUS) 100-50 mcg/dose diskus inhaler Take 1 Puff by inhalation every twelve (12) hours. No current facility-administered medications for this visit. SYSTEM REVIEW NOT RELATED TO LIVER DISEASE OR REVIEWED ABOVE:  Constitution systems: Negative for fever, chills, weight gain, weight loss. Eyes: Negative for visual changes. ENT: Negative for sore throat, painful swallowing. Respiratory: Negative for cough, hemoptysis, SOB. Cardiology: Negative for chest pain, palpitations. GI:  Negative for constipation or diarrhea. : Negative for urinary frequency, dysuria, hematuria, nocturia. Skin: Negative for rash. Hematology: Negative for easy bruising, blood clots. Musculo-skelatal: Negative for back pain, muscle pain, weakness. Neurologic: Negative for headaches, dizziness, vertigo, memory problems not related to HE. Psychology: Negative for anxiety, depression. FAMILY HISTORY:  The father is alive and healthy. The mother has the following chronic diseases: HBV. SOCIAL HISTORY:  The patient is . The spouse has been checked for HBV negative. The patient has 2 children,   The children have been vaccinated for HBV. The patient has never used tobacco products. The patient has never consumed significant amounts of alcohol. The patient currently works full time in [employer]. PHYSICAL EXAMINATION:  Visit Vitals  /77 (BP 1 Location: Right arm, BP Patient Position: Sitting)   Pulse 75   Temp 97.7 °F (36.5 °C)   Ht 5' 4\" (1.626 m)   Wt 118 lb (53.5 kg)   SpO2 97%   BMI 20.25 kg/m²     General: No acute distress. Eyes: Sclera anicteric. ENT: No oral lesions. Thyroid normal.  Nodes: No adenopathy.    Skin: No spider angiomata. No jaundice. No palmar erythema. Respiratory: Lungs clear to auscultation. Cardiovascular: Regular heart rate. No murmurs. No JVD. Abdomen: Soft non-tender. Liver size normal to percussion/palpation. Spleen not palpable. No obvious ascites. Extremities: No edema. No muscle wasting. No gross arthritic changes. Neurologic: Alert and oriented. Cranial nerves grossly intact. No asterixis. LABORATORY STUDIES:  Liver [place] of [address] Units 5/13/2019 11/7/2018   WBC 4.0 - 11.0 K/uL 5.9 8.7   ANC 1.8 - 7.7 K/uL 3.5 4.9   HGB 11.7 - 16.0 g/dL 13.7 14.2    - 440 K/uL 265 295   AST 10 - 37 U/L 17 20   ALT 5 - 40 U/L 16 18   Alk Phos 25 - 115 U/L 50 51   Bili, Total 0.2 - 1.2 mg/dL 0.2 0.3   Bili, Direct 0.0 - 0.3 mg/dL <0.2 <0.2   Albumin 3.5 - 5.0 g/dL 4.7 4.5   BUN 6 - 22 mg/dL 12 22   Creat 0.5 - 1.2 mg/dL 0.5 0.7   Na 133 - 145 mmol/L 140 140   K 3.5 - 5.5 mmol/L 3.8 4.0   Cl 98 - 110 mmol/L 102 98   CO2 20 - 32 mmol/L 26 26   Glucose 70 - 99 mg/dL 89 51 (L)     SEROLOGIES:  Serologies Latest Ref Rng & Units 5/23/2018   Hep A Ab, Total Negative Positive (A)   Hep B Surface Ag None Detec Detected (A)     5/2018. HBEantigen positive, anti-HBE negative, HBV ,000 intU, anti-HCV negative  5/2019. HBV DNA 13.2  IU/mL    LIVER HISTOLOGY:  5/2018. Sheer wave elastography performed at [facility]. E Range: 5.31-9.09 kPa, E Mean: 7.53 kPa, E Median: 7.71 kPa, E Std: 1.25 kPa, The results suggested a fibrosis level of F1-2. ENDOSCOPIC PROCEDURES:  Not available or performed    RADIOLOGY:  5/2018. Ultrasound of liver. Echogenic consistent with chronic liver disease. No liver mass lesions. No dilated bile ducts. No ascites. OTHER TESTING:  Not available or performed    FOLLOW UP:  All of the issues listed in the Assessment and Plan were discussed with the patient. All questions were answered.   The patient expressed a clear understanding of the above.    [address] [facility] 87 in 6 months for routine monitoring of HBV. Patient prefers to be seen yearly at this time. She understands monitoring should be every 6 months.        Britni Horn, AGPCNP-BC  [address] Liver [place] [facility]  4 [facility], [address] [place] [address] [place] Kaity Mendoza, [address]  [phone]

## 2019-05-21 NOTE — PROGRESS NOTES
1. Have you been to the ER, urgent care clinic since your last visit? Hospitalized since your last visit? No    2. Have you seen or consulted any other health care providers outside of the [address] [place] [address] since your last visit? Include any pap smears or colon screening.  No

## 2019-07-13 ENCOUNTER — HOSPITAL ENCOUNTER (OUTPATIENT)
Dept: ULTRASOUND IMAGING | Age: 49
Discharge: HOME | End: 2019-07-13
Attending: NURSE PRACTITIONER
Payer: COMMERCIAL

## 2019-07-13 DIAGNOSIS — B18.1 CHRONIC HEPATITIS B (HCC): ICD-10-CM

## 2019-07-13 PROCEDURE — 76981 USE PARENCHYMA: CPT

## 2019-07-29 NOTE — PROGRESS NOTES
Please call patient and let them know that their recent ultrasound is stable with no concerning lesions/masses within the liver. I will see them at their next follow up appointment.

## 2019-12-21 RX ORDER — TENOFOVIR ALAFENAMIDE 25 MG/1
TABLET ORAL
Qty: 90 TAB | Refills: 12 | Status: SHIPPED | OUTPATIENT
Start: 2019-12-21 | End: 2020-01-16

## 2020-01-16 RX ORDER — TENOFOVIR ALAFENAMIDE 25 MG/1
TABLET ORAL
Qty: 90 TAB | Refills: 3 | Status: SHIPPED | OUTPATIENT
Start: 2020-01-16 | End: 2020-11-28

## 2020-11-03 ENCOUNTER — VIRTUAL VISIT (OUTPATIENT)
Dept: HEMATOLOGY | Age: 50
End: 2020-11-03
Payer: MEDICAID

## 2020-11-03 DIAGNOSIS — B18.1 CHRONIC HEPATITIS B (HCC): ICD-10-CM

## 2020-11-03 DIAGNOSIS — B18.1 CHRONIC HEPATITIS B (HCC): Primary | ICD-10-CM

## 2020-11-03 PROCEDURE — 99213 OFFICE O/P EST LOW 20 MIN: CPT | Performed by: INTERNAL MEDICINE

## 2020-11-03 NOTE — PROGRESS NOTES
VIRTUAL TELEHEALTH VISIT PERFORMED DUE TO COVID-19 EPIDEMIC    CONSENT:  Janna Jerez, who was seen by synchronous, real-time, audio-video technology, and/or her healthcare decision maker, is aware that this patient-initiated, Telehealth encounter on 11/3/2020 is a billable service, with coverage as determined by her insurance carrier. She is aware that she may receive a bill and has provided verbal consent to proceed. This patient was evaluated during a Virtual Telehealth visit. A caregiver was present if appropriate. Due to this being a TeleHealth encounter performed during the [facility]- public health emergency, the physical examination was limited to that listed in the [address] [facility] Giancarlo Campa MD, Loyce Cabot, Meta Patrick, MD, MPH      TORRIE Kim, [facility]     April S Lo, [facility]   Kee Colón, [facility] Monico Stoddard, Pending sale to [facility] 136    at [address], [address] [place] [address] 22.    [phone]    FAX: [address] [place] [address], [address]    [phone]    FAX: [phone]       Patient Care Team:  Tarik Washington MD as PCP - General (Family Medicine)  Marquita Liu NP. Fax [phone]      Problem List  Date Reviewed: 8/9/2018          Codes Class Noted    Chronic hepatitis B [facility]) ICD-10-CM: B18.1  ICD-9-CM: 070.32  5/23/2018              Janna Jerez [facility] the [facility] for management of chronic HBV. The active problem list, all pertinent past medical history, medications, radiologic findings and laboratory findings related to the liver disorder were reviewed with the patient.       The patient is a 53y.o. year old  female who tested positive for HBV in the 1990s before she immigrated to the [place] from [facility] in 2005. She has E-antigen positive active HBV   She has been on Vemlidy since 6/2018. The patient has no symptoms which can be attributed to the liver disorder. The patient has not experienced fatigue, pain in the right side over the liver. The patient completes all daily activities without any functional limitations. ASSESSMENT AND PLAN:  Chronic HBV  Chronic HBV that is E-antigen negative active   The degree of liver injury was assessed by elastography 7/2019. This was consistent with stage 1 fibrosis. Liver transaminases are normal.  ALP is normal.  Liver function is normal.  The platelet count is normal.    HBV DNA is 13 IU/ml. The patient is receiving treatment with tenofovir-AF [facility])    The patientis responding to and tolerating treatment without significant side effects. Will performed laboratory testing to monitor liver function and degree of liver injury. This included BMP, hepatic panel, CBC with platelet count,     Will repeat E antigen and antibody status. Will repeat HBV DNA    The need to perform an assessment of liver fibrosis was discussed with the patient. The Fibroscan can assess liver fibrosis and determine if a patient has advanced fibrosis or cirrhosis without the need for liver biopsy. This will be performed at the next office visit. The Fibroscan can be repeated annually or as often as clinically indicated to assess for fibrosis progression and/or regression. Screening for Hepatocellular Carcinoma  [address] screening has not been not been performed since 7/2020. AFP was ordered today and ultrasound will be scheduled. She is now 49years of age and [address] screening should be performed every 6 months. Treatment of other medical problems in patients with chronic liver disease  The patient was directed to continue all current medications at the current dosages.     There are no contraindications for the patient to take any medications that are necessary for treatment of other medical issues. Counseling for alcohol in patients with chronic liver disease  The patient was counseled regarding alcohol consumption and the effect of alcohol on chronic liver disease. The patient does not consume any significant amount of alcohol. Vaccinations   Vaccination for viral hepatitis A is not needed. The patient has serologic evidence of prior exposure or vaccination with immunity. Routine vaccinations against other bacterial and viral agents can be performed as indicated. Annual flu vaccination should be administered if indicated. ALLERGIES  Allergies   Allergen Reactions    Aleve [Naproxen Sodium] Other (comments)    Tylenol [Acetaminophen] Other (comments)       MEDICATIONS  Current Outpatient Medications   Medication Sig    VEMLIDY 25 mg tab TAKE 1 TABLET BY MOUTH ONE TIME DAILY    mometasone (NASONEX) 50 mcg/actuation nasal spray 2 Sprays daily.  fluticasone-salmeterol (ADVAIR DISKUS) 100-50 mcg/dose diskus inhaler Take 1 Puff by inhalation every twelve (12) hours. No current facility-administered medications for this visit. SYSTEM REVIEW NOT RELATED TO LIVER DISEASE OR REVIEWED ABOVE:  Constitution systems: Negative for fever, chills, weight gain, weight loss. Eyes: Negative for visual changes. ENT: Negative for sore throat, painful swallowing. Respiratory: Negative for cough, hemoptysis, SOB. Cardiology: Negative for chest pain, palpitations. GI:  Negative for constipation or diarrhea. : Negative for urinary frequency, dysuria, hematuria, nocturia. Skin: Negative for rash. Hematology: Negative for easy bruising, blood clots. Musculo-skelatal: Negative for back pain, muscle pain, weakness. Neurologic: Negative for headaches, dizziness, vertigo, memory problems not related to HE. Psychology: Negative for anxiety, depression.        FAMILY HISTORY:  The father is alive and healthy. The mother has the following chronic diseases: HBV. SOCIAL HISTORY:  The patient is . The spouse has been checked for HBV negative. The patient has 2 children,   The children have been vaccinated for HBV. The patient has never used tobacco products. The patient has never consumed significant amounts of alcohol. The patient currently works full time in [place]. PHYSICAL EXAMINATION PERFORMED BY VIRTUAL TELEHEALTH:  VS: Not performed   General: No acute distress. Eyes: Sclera anicteric. ENT: No oral lesions. Skin: No rashes. spider angiomata. No jaundice. Abdomen: No obvious distention suggesting ascites. Extremities: No edema. No muscle wasting. Neurologic: Alert and oriented. Cranial nerves grossly intact. LABORATORY STUDIES:  Liver [place] [address] 5/13/2019 11/7/2018   WBC 4.0 - 11.0 K/uL 5.9 8.7   ANC 1.8 - 7.7 K/uL 3.5 4.9   HGB 11.7 - 16.0 g/dL 13.7 14.2    - 440 K/uL 265 295   AST 10 - 37 U/L 17 20   ALT 5 - 40 U/L 16 18   Alk Phos 25 - 115 U/L 50 51   Bili, Total 0.2 - 1.2 mg/dL 0.2 0.3   Bili, Direct 0.0 - 0.3 mg/dL <0.2 <0.2   Albumin 3.5 - 5.0 g/dL 4.7 4.5   BUN 6 - 22 mg/dL 12 22   Creat 0.5 - 1.2 mg/dL 0.5 0.7   Na 133 - 145 mmol/L 140 140   K 3.5 - 5.5 mmol/L 3.8 4.0   Cl 98 - 110 mmol/L 102 98   CO2 20 - 32 mmol/L 26 26   Glucose 70 - 99 mg/dL 89 51 (L)     SEROLOGIES:  Serologies Latest Ref Rng & Units 5/23/2018   Hep A Ab, Total Negative Positive (A)   Hep B Surface Ag None Detec Detected (A)     5/2018. HBEantigen positive, anti-HBE negative, HBV ,000 intU, anti-HCV negative  5/2019. HBV DNA 13.2  IU/mL    LIVER HISTOLOGY:  5/2018. Sheer wave elastography performed at [facility]. E Range: 5.31-9.09 kPa, E Mean: 7.53 kPa, E Median: 7.71 kPa, E Std: 1.25 kPa, The results suggested a fibrosis level of F1-2.  7/2019. Sheer wave elastography performed at [facility].   E Range: 4.74-6.06 kPa, E Mean: 0.59 kPa, E Median: 5.80 kPa, E Std: 0.49 kPa. The results suggested a fibrosis level of F1.    ENDOSCOPIC PROCEDURES:  Not available or performed    RADIOLOGY:  5/2018. Ultrasound of liver. Echogenic consistent with chronic liver disease. No liver mass lesions. No dilated bile ducts. No ascites. 7/2019. Ultrasound of liver. Echogenic consistent with chronic liver disease. No liver mass lesions. No dilated bile ducts. No ascites. OTHER TESTING:  Not available or performed    FOLLOW-UP AFTER VIRTUAL VISIT:  Pursuant to the emergency declaration under the [facility], [facility] waiver authority and the [employer] Act, this Virtual  Visit was conducted, with the patient's (and/or their legal guardian's) consent, to reduce the patient's risk of exposure to COVID-19 and provide necessary medical care. Services were provided through a video synchronous discussion virtually to substitute for an in-person clinic visit. The patient was located in their home. The provider was located in the [place] [address] office. All of the issues listed above in the Assessment and Plan were discussed with the patient. All questions were answered. The patient expressed a clear understanding of the above. Orders to obtain laboratory testing will be mailed to the patient and obtained 1 week prior to the next TeleHealth or in-person encounter. An in-person follow-up visit will be scheduled at [place] [address] in 6 months for routine monitoring.       Barrington Kraus MD  [address] [address], [address] [place] [address], [address] [place] [address] - [address]  [address] [place] [address]

## 2020-11-14 ENCOUNTER — HOSPITAL ENCOUNTER (OUTPATIENT)
Dept: ULTRASOUND IMAGING | Age: 50
Discharge: HOME | End: 2020-11-14
Attending: INTERNAL MEDICINE
Payer: COMMERCIAL

## 2020-11-14 ENCOUNTER — HOSPITAL ENCOUNTER (OUTPATIENT)
Dept: LAB | Age: 50
Discharge: HOME | End: 2020-11-14

## 2020-11-14 DIAGNOSIS — B18.1 CHRONIC HEPATITIS B (HCC): ICD-10-CM

## 2020-11-14 LAB
ALBUMIN SERPL-MCNC: 4.5 G/DL (ref 3.4–5)
ALBUMIN/GLOB SERPL: 1.3 {RATIO} (ref 0.8–1.7)
ALP SERPL-CCNC: 56 U/L (ref 45–117)
ALT SERPL-CCNC: 18 U/L (ref 13–56)
ANION GAP SERPL CALC-SCNC: 6 MMOL/L (ref 3–18)
AST SERPL-CCNC: 12 U/L (ref 10–38)
BASOPHILS # BLD: 0 K/UL (ref 0–0.1)
BASOPHILS NFR BLD: 0 % (ref 0–2)
BILIRUB SERPL-MCNC: 0.7 MG/DL (ref 0.2–1)
BUN SERPL-MCNC: 15 MG/DL (ref 7–18)
BUN/CREAT SERPL: 29 (ref 12–20)
CALCIUM SERPL-MCNC: 9.3 MG/DL (ref 8.5–10.1)
CHLORIDE SERPL-SCNC: 107 MMOL/L (ref 100–111)
CO2 SERPL-SCNC: 29 MMOL/L (ref 21–32)
CREAT SERPL-MCNC: 0.51 MG/DL (ref 0.6–1.3)
DIFFERENTIAL METHOD BLD: NORMAL
EOSINOPHIL # BLD: 0.2 K/UL (ref 0–0.4)
EOSINOPHIL NFR BLD: 4 % (ref 0–5)
ERYTHROCYTE [DISTWIDTH] IN BLOOD BY AUTOMATED COUNT: 12.6 % (ref 11.6–14.5)
GLOBULIN SER CALC-MCNC: 3.4 G/DL (ref 2–4)
GLUCOSE SERPL-MCNC: 78 MG/DL (ref 74–99)
HCT VFR BLD AUTO: 43 % (ref 35–45)
HGB BLD-MCNC: 14.5 G/DL (ref 12–16)
LYMPHOCYTES # BLD: 1.7 K/UL (ref 0.9–3.6)
LYMPHOCYTES NFR BLD: 33 % (ref 21–52)
MCH RBC QN AUTO: 31.7 PG (ref 24–34)
MCHC RBC AUTO-ENTMCNC: 33.7 G/DL (ref 31–37)
MCV RBC AUTO: 94.1 FL (ref 74–97)
MONOCYTES # BLD: 0.3 K/UL (ref 0.05–1.2)
MONOCYTES NFR BLD: 6 % (ref 3–10)
NEUTS SEG # BLD: 3 K/UL (ref 1.8–8)
NEUTS SEG NFR BLD: 57 % (ref 40–73)
PLATELET # BLD AUTO: 268 K/UL (ref 135–420)
PMV BLD AUTO: 9.3 FL (ref 9.2–11.8)
POTASSIUM SERPL-SCNC: 4 MMOL/L (ref 3.5–5.5)
PROT SERPL-MCNC: 7.9 G/DL (ref 6.4–8.2)
RBC # BLD AUTO: 4.57 M/UL (ref 4.2–5.3)
SODIUM SERPL-SCNC: 142 MMOL/L (ref 136–145)
WBC # BLD AUTO: 5.2 K/UL (ref 4.6–13.2)

## 2020-11-14 PROCEDURE — 87517 HEPATITIS B DNA QUANT: CPT

## 2020-11-14 PROCEDURE — 36415 COLL VENOUS BLD VENIPUNCTURE: CPT

## 2020-11-14 PROCEDURE — 80053 COMPREHEN METABOLIC PANEL: CPT

## 2020-11-14 PROCEDURE — 76705 ECHO EXAM OF ABDOMEN: CPT

## 2020-11-14 PROCEDURE — 82107 ALPHA-FETOPROTEIN L3: CPT

## 2020-11-14 PROCEDURE — 87350 HEPATITIS BE AG IA: CPT

## 2020-11-14 PROCEDURE — 86707 HEPATITIS BE ANTIBODY: CPT

## 2020-11-14 PROCEDURE — 85025 COMPLETE CBC W/AUTO DIFF WBC: CPT

## 2020-11-17 LAB
HBV DNA SERPL NAA+PROBE-ACNC: <10 IU/ML
HBV DNA SERPL NAA+PROBE-LOG IU: NORMAL LOG10 IU/ML
TEST INFORMATION: NORMAL

## 2020-11-21 LAB
AFP L3 MFR SERPL: NORMAL % (ref 0–9.9)
AFP SERPL-MCNC: 2.1 NG/ML (ref 0–8)
HBV E AB SERPL QL IA: NEGATIVE
HBV E AG SERPL QL IA: POSITIVE

## 2020-11-28 RX ORDER — TENOFOVIR ALAFENAMIDE 25 MG/1
TABLET ORAL
Qty: 90 TAB | Refills: 3 | Status: SHIPPED | OUTPATIENT
Start: 2020-11-28 | End: 2021-11-02

## 2021-04-23 ENCOUNTER — HOSPITAL ENCOUNTER (OUTPATIENT)
Dept: PHYSICAL THERAPY | Age: 51
Discharge: HOME | End: 2021-04-23
Payer: COMMERCIAL

## 2021-04-23 PROCEDURE — 97162 PT EVAL MOD COMPLEX 30 MIN: CPT | Performed by: GENERAL ACUTE CARE HOSPITAL

## 2021-04-23 PROCEDURE — 97110 THERAPEUTIC EXERCISES: CPT | Performed by: GENERAL ACUTE CARE HOSPITAL

## 2021-04-23 NOTE — PROGRESS NOTES
PT DAILY TREATMENT NOTE     Patient Name: Ck Saavedra  Date:2021  : 1970  [x]  Patient  Verified  Payor: Shelby Gradyer / Plan: Lety Teresa / Product Type: HMO /    In time:205  Out time: 245  Total Treatment Time (min): 40  Total Timed Codes (min): 8  1:1 Treatment Time (min): 30   Visit #: 1 of     Treatment Area: Neck pain [M54.2]    SUBJECTIVE  Pain Level (0-10 scale): 7/10   Any medication changes, allergies to medications, adverse drug reactions, diagnosis change, or new procedure performed?: [x] No    [] Yes (see summary sheet for update)  Subjective functional status/changes:   [] No changes reported  See IE    OBJECTIVE  Modality rationale: decrease pain and increase tissue extensibility to improve the patients ability to perform ADL's   Min Type Additional Details    [] Estim: []Att   []Unatt  []TENS instruct                 []IFC  []Premod []NMES                       []Other:  []w/US   []w/ice   []w/heat  Position:  Location:    []  Traction: [] Cervical       []Lumbar                       [] Prone          []Supine                       []Intermittent   []Continuous Lbs:  [] before manual  [] after manual    []  Ultrasound: []Continuous   [] Pulsed                           []1MHz   []3MHz Location:  W/cm2:    []  Iontophoresis with dexamethasone         Location: [] Take home patch   [] In clinic   10 []  Ice     [x]  heat  []  Ice massage Position: supine  Location: C/s    []  Vasopneumatic Device Pressure: [] lo [] med [] hi   Temp: [] lo [] med [] hi   [x] Skin assessment post-treatment:  [x]intact []redness- no adverse reaction       []redness - adverse reaction:       8 min Therapeutic Exercise:  [] See flow sheet :   Rationale: increase ROM and increase strength to improve the patients ability to perform ADL's, improve posture               min Patient Education: [x] Review HEP    [] Progressed/Changed HEP based on:   Given initial HEP - see chart for copy.  Educated on postural correction. Other Objective/Functional Measures:   Justification for Eval Code Complexity:  Patient History : asthma, Hepatitis B  Examination see exam   Clinical Presentation: evolving  Clinical Decision Making : FOTO : 52 /100  Pain Level (0-10 scale) post treatment: 6/10    ASSESSMENT/Changes in Function: See POC    Patient will continue to benefit from skilled PT services to modify and progress therapeutic interventions, address functional mobility deficits, address ROM deficits, address strength deficits, analyze and address soft tissue restrictions, analyze and cue movement patterns, analyze and modify body mechanics/ergonomics, assess and modify postural abnormalities and instruct in home and community integration to attain remaining goals.      [x]  See Plan of Care  []  See progress note/recertification  []  See Discharge Summary         Progress towards goals / Updated goals:  See POC    PLAN  [x]  Upgrade activities as tolerated     [x]  Continue plan of care  []  Update interventions per flow sheet       []  Discharge due to:_  []  Other:_      aKrl Sanderson, PT 4/23/2021  4:02 PM

## 2021-04-23 NOTE — PROGRESS NOTES
[facility]  [address] [facility] [address] Lamin Voss, [address] [facility] [address]  Ph [phone]  Fx [phone]    Plan of Care/ Statement of Necessity for Physical Therapy Services    Patient name: Jaya Servin Start of Care: 2021   Referral source: Todd Harper MD : 1970    Medical Diagnosis: Neck pain [M54.2]   Onset Date: 2021    Treatment Diagnosis: Neck pain [M54.2]                                              ICD-10:  M54.2   Prior Hospitalization: see medical history Provider#: [id number]   Medications: Verified on Patient summary List    Comorbidities: asthma, Hepatitis B    Prior Level of Function: Independent with ADL's, works at [employer] as a cook      The Plan of Care and following information is based on the information from the initial evaluation. Assessment/ key information: Pt is a RHD 48 y/o female who presents with co L sided neck pain that began 2 months ago with insidious onset. Pt works as a cook at [employer] and states she has been working hard lately which she feels has caused onset of her symptoms. Denies a specific LEI. Denies headaches, but reports \"heaviness\" feeling on L occiput. Pt denies radicular symptoms into UE's. Pt reports onset of numbness in L lower half of her face with occasional reports of difficulty with motor control during speech. Instructed to inform MD as this is likely non-musculoskeletal in nature. CN assessment (-) for abnormalities. Will continue to monitor. See objective findings below for cervical spine:     Pain (C) 7/10 (B) 6/10 (W) 9/10   Aggravating factors: looking down, rotating, laying on L side, prolonged sittng  Easing factors: heat, standing   Palpation: +ttp L upper trap, levator scap, suboccipitals, cervical paraspinals, scalenes  Posture: rounded shoulders, forward head    Cervical AROM: flex 43 p!,  Ext 40 , SB R 25, L 20 p!   Rot L 52, R 70  Shoulder screen: WFL range, but reports tightness at end ranges on L UE  Spurling's (-)    Shoulder strength: L grossly 4/5, R 4+ to 5/5      Evaluation Complexity History MEDIUM  Complexity : 1-2 comorbidities / personal factors will impact the outcome/ POC ; Examination HIGH Complexity : 4+ Standardized tests and measures addressing body structure, function, activity limitation and / or participation in recreation  ;Presentation MEDIUM Complexity : Evolving with changing characteristics  ; Clinical Decision Making MEDIUM Complexity : FOTO score of 26-74 : FOTO score = an established functional score where 100 = no disability  Overall Complexity Rating: MEDIUM  Problem List: pain affecting function, decrease ROM, decrease strength, decrease ADL/ functional abilitiies, decrease activity tolerance, decrease flexibility/ joint mobility and decrease transfer abilities   Treatment Plan may include any combination of the following: Therapeutic exercise, Therapeutic activities, Neuromuscular re-education, Physical agent/modality, Manual therapy, Patient education, Self Care training, Functional mobility training and Home safety training  Patient / Family readiness to learn indicated by: asking questions, trying to perform skills and interest  Persons(s) to be included in education: patient (P)  Barriers to Learning/Limitations: None  Measures taken if barriers to learning: none  Patient Goal (s): reduce pain in neck  Patient Self Reported Health Status: good  Rehabilitation Potential: good    Short Term Goals: To be accomplished in 1-2 weeks:   1. Pt will be independent and compliant with HEP for carryover of strength and mobility gains    Status at Eval: Issued at    2. Pt will report >25% improvement in overall neck symptoms for ease of completing ADL's   Status at Eval: constant symptoms  Long Term Goals: To be accomplished in 4-6 weeks:   1. Pt will score >67/100 on FOTO to show significant improvement in functional mobility and QOL    Status at Eval: 52/100   2.  Pt will report >75% improvement in overall neck symptoms for ease of completing ADL's   Status at [facility]: constant symptoms   3. Pt will demo cervical rotation AROM >65 on L for improved ability to check blind spots    Status at [facility]: Cervical rotation L 52, R 70   4. Pt will report worst pain as <3/10 for improved activity tolerance and ability to complete work duties   Status at [employer]: worst pain 9/10   Frequency / Duration: Patient to be seen 2-3 times per week for 4-6 weeks. Patient/ Caregiver education and instruction: Diagnosis, prognosis, activity modification and exercises   [x]  Plan of care has been reviewed with PTA    Certification Period: lila Conde, PT 4/23/2021 11:06 AM    ________________________________________________________________________    I certify that the above Therapy Services are being furnished while the patient is under my care. I agree with the treatment plan and certify that this therapy is necessary.     Physician's Signature:_____________________Date:____________TIME:________                                      Vincent Moore MD      ** Signature, Date and Time must be completed for valid certification **  Please sign and return to [facility]  [address] Megan [address]  Stephanie Lozoya, [address] [place] [address]  Ph [phone]  Fx [phone]

## 2021-05-07 ENCOUNTER — HOSPITAL ENCOUNTER (OUTPATIENT)
Dept: PHYSICAL THERAPY | Age: 51
Discharge: HOME | End: 2021-05-07
Payer: COMMERCIAL

## 2021-05-07 PROCEDURE — 97140 MANUAL THERAPY 1/> REGIONS: CPT

## 2021-05-07 PROCEDURE — 97110 THERAPEUTIC EXERCISES: CPT

## 2021-05-07 PROCEDURE — 97112 NEUROMUSCULAR REEDUCATION: CPT

## 2021-05-07 NOTE — PROGRESS NOTES
PT DAILY TREATMENT NOTE     Patient Name: Linda Jameson  Date:2021  : 1970  [x]  Patient  Verified  Payor: Jenae January / Plan: Daren Stall / Product Type: HMO /    In time:1:56 PM  Out time:2:36 PM  Total Treatment Time (min): 40  Visit #: 2 of     Medicare/BCBS Only   Total Timed Codes (min):  40 1:1 Treatment Time:  40       Treatment Area: Neck pain [M54.2]    SUBJECTIVE  Pain Level (0-10 scale): 0  Any medication changes, allergies to medications, adverse drug reactions, diagnosis change, or new procedure performed?: [x] No    [] Yes (see summary sheet for update)  Subjective functional status/changes:   [] No changes reported  Patient reports HEP compliance. \"My neck is getting much better. \"    OBJECTIVE      10 min Therapeutic Exercise:  [x] See flow sheet :   Rationale: increase ROM, increase strength and improve coordination to improve the patients ability to increase ease with ADLs      20 min Neuromuscular Re-education:  [x]  See flow sheet :   Rationale: increase strength and increase proprioception  to improve the patients ability to promote postural awareness    10 min Manual Therapy: In supine: SOR, gentle STM to bilateral c/s paraspinals and UT. In prone: grade II c/s and t/s PAs   The manual therapy interventions were performed at a separate and distinct time from the therapeutic activities interventions. Rationale: decrease pain, increase ROM and increase tissue extensibility to ease ADL tolerance      With   [] TE   [] TA   [] neuro   [] other: Patient Education: [x] Review HEP    [] Progressed/Changed HEP based on:   [] positioning   [] body mechanics   [] transfers   [] heat/ice application    [] other:      Other Objective/Functional Measures:   First follow up session     Pain Level (0-10 scale) post treatment: 0    ASSESSMENT/Changes in Function:   Initiated POC as per flowsheet. Patient puts forth good motivation with session and reports HEP compliance.  She presents with poor posture in all positions characterized by FH and forward rounded shoulders. Patient will continue to benefit from skilled PT services to modify and progress therapeutic interventions, address functional mobility deficits, address ROM deficits, address strength deficits, analyze and address soft tissue restrictions, analyze and cue movement patterns, analyze and modify body mechanics/ergonomics and assess and modify postural abnormalities to attain remaining goals. []  See Plan of Care  []  See progress note/recertification  []  See Discharge Summary         Progress towards goals / Updated goals:  Short Term Goals: To be accomplished in 1-2 weeks:               1. Pt will be independent and compliant with HEP for carryover of strength and mobility gains                   Status at Eval: Issued at    Current: met, patient reports HEP compliance (5/7/21)               2. Pt will report >25% improvement in overall neck symptoms for ease of completing ADL's               Status at [facility]: constant symptoms  Long Term Goals:  To be accomplished in 4-6 weeks:               1. Pt will score >67/100 on FOTO to show significant improvement in functional mobility and QOL                Status at [facility]: 52/100               2. Pt will report >75% improvement in overall neck symptoms for ease of completing ADL's               Status at [facility]: constant symptoms               3. Pt will demo cervical rotation AROM >65 on L for improved ability to check blind spots                Status at [facility]: Cervical rotation L 52, R 70               4. Pt will report worst pain as <3/10 for improved activity tolerance and ability to complete work duties               Status at [facility]: worst pain 9/10     PLAN  []  Upgrade activities as tolerated     [x]  Continue plan of care  []  Update interventions per flow sheet       []  Discharge due to:_  []  Other:_      Fara Venegas 5/7/2021  1:45 PM    Future Appointments   Date Time Provider Kelly Lozada   5/7/2021  2:00  [address] [facility]   5/10/2021  1:10 PM ANDREA Be [facility]

## 2021-05-10 ENCOUNTER — OFFICE VISIT (OUTPATIENT)
Dept: HEMATOLOGY | Age: 51
End: 2021-05-10
Payer: COMMERCIAL

## 2021-05-10 ENCOUNTER — HOSPITAL ENCOUNTER (OUTPATIENT)
Dept: LAB | Age: 51
Discharge: HOME | End: 2021-05-10

## 2021-05-10 VITALS
RESPIRATION RATE: 15 BRPM | OXYGEN SATURATION: 98 % | SYSTOLIC BLOOD PRESSURE: 114 MMHG | HEIGHT: 64 IN | BODY MASS INDEX: 19.97 KG/M2 | DIASTOLIC BLOOD PRESSURE: 66 MMHG | HEART RATE: 80 BPM | WEIGHT: 117 LBS | TEMPERATURE: 97.5 F

## 2021-05-10 DIAGNOSIS — B18.1 CHRONIC HEPATITIS B (HCC): Primary | ICD-10-CM

## 2021-05-10 LAB — XX-LABCORP SPECIMEN COL,LCBCF: NORMAL

## 2021-05-10 PROCEDURE — 99001 SPECIMEN HANDLING PT-LAB: CPT

## 2021-05-10 PROCEDURE — 99213 OFFICE O/P EST LOW 20 MIN: CPT | Performed by: NURSE PRACTITIONER

## 2021-05-10 NOTE — PROGRESS NOTES
[facility] [facility], MD, FACP, Oc Young MD, MPH      Betsey Kellogg, PA-[place] Maldonado, [facility]     April S Lo, [facility]BC   Lazaro TIFFANI Yoder-[place] Vargas, [facility]       [facility] [address]    at [address], [facility] Luis Manuel Richey  22.    [phone]    FAX: [address] [place] [address], [address]    [phone]    FAX: [phone]       Patient Care Team:  Bing Cordova MD as PCP - General (Family Medicine)  Marleni Fernandez NP. Fax [phone]      Problem List  Date Reviewed: 8/9/2018          Codes Class Noted    Chronic hepatitis B [facility]) ICD-10-CM: B18.1  ICD-9-CM: 070.32  5/23/2018              Mayra Sanchez returns to the [address] for management of chronic HBV. The active problem list, all pertinent past medical history, medications, radiologic findings and laboratory findings related to the liver disorder were reviewed with the patient. The patient is a 49y.o. year old  female who tested positive for HBV in the 36s before she immigrated to the [place] from [facility] in 2005. She has E-antigen positive active HBV   She has been on Vemlidy since 6/2018. The patient has no symptoms which can be attributed to the liver disorder. The patient has not experienced fatigue, pain in the right side over the liver. The patient completes all daily activities without any functional limitations. ASSESSMENT AND PLAN:  Chronic HBV  Chronic HBV that is E-antigen negative active   The degree of liver injury was assessed by elastography 7/2019. This was consistent with stage 1 fibrosis.     Liver transaminases are normal.  ALP is normal.  Liver function is normal.  The platelet count is normal.    HBV DNA is 13 IU/ml. The patient is receiving treatment with tenofovir-AF  Coil). The patientis responding to and tolerating treatment without significant side effects. Will perform laboratory testing to monitor liver function and degree of liver injury. This included BMP, hepatic panel, CBC with platelet count    Will repeat E antigen and antibody status. Will repeat HBV DNA    The need to perform an assessment of liver fibrosis was discussed with the patient. The Fibroscan can assess liver fibrosis and determine if a patient has advanced fibrosis or cirrhosis without the need for liver biopsy. The Fibroscan can be repeated annually or as often as clinically indicated to assess for fibrosis progression and/or regression. Screening for Hepatocellular Carcinoma  HCC screening in patients with HBV should be performed with ultrasound on an annual basis in patients with inactive HBV who are under the age of 48 years. Over the age of 48 years [address] screening with ultrasound should be performed every 6 months. [address] screening has recently been performed and does not suggest [address] The next liver imaging study will be performed in 5/2021. AFP was ordered today and ultrasound will be scheduled. Treatment of other medical problems in patients with chronic liver disease  The patient was directed to continue all current medications at the current dosages. There are no contraindications for the patient to take any medications that are necessary for treatment of other medical issues. Counseling for alcohol in patients with chronic liver disease  The patient was counseled regarding alcohol consumption and the effect of alcohol on chronic liver disease. The patient does not consume any significant amount of alcohol. Vaccinations   Vaccination for viral hepatitis A is not needed.   The patient has serologic evidence of prior exposure or vaccination with immunity. Routine vaccinations against other bacterial and viral agents can be performed as indicated. Annual flu vaccination should be administered if indicated. ALLERGIES  Allergies   Allergen Reactions    Aleve [Naproxen Sodium] Other (comments)    Tylenol [Acetaminophen] Other (comments)       MEDICATIONS  Current Outpatient Medications   Medication Sig    Vemlidy 25 mg tablet TAKE 1 TABLET BY MOUTH ONE TIME DAILY    mometasone (NASONEX) 50 mcg/actuation nasal spray 2 Sprays daily.  fluticasone-salmeterol (ADVAIR DISKUS) 100-50 mcg/dose diskus inhaler Take 1 Puff by inhalation every twelve (12) hours. No current facility-administered medications for this visit. SYSTEM REVIEW NOT RELATED TO LIVER DISEASE OR REVIEWED ABOVE:  Constitution systems: Negative for fever, chills, weight gain, weight loss. Eyes: Negative for visual changes. ENT: Negative for sore throat, painful swallowing. Respiratory: Negative for cough, hemoptysis, SOB. Cardiology: Negative for chest pain, palpitations. GI:  Negative for constipation or diarrhea. : Negative for urinary frequency, dysuria, hematuria, nocturia. Skin: Negative for rash. Hematology: Negative for easy bruising, blood clots. Musculo-skelatal: Negative for back pain, muscle pain, weakness. Neurologic: Negative for headaches, dizziness, vertigo, memory problems not related to HE. Psychology: Negative for anxiety, depression. FAMILY HISTORY:  The father is alive and healthy. The mother has the following chronic diseases: HBV. SOCIAL HISTORY:  The patient is . The spouse has been checked for HBV negative. The patient has 2 children. The children have been vaccinated for HBV. The patient has never used tobacco products. The patient has never consumed significant amounts of alcohol. The patient currently works full time in [employer].       PHYSICAL EXAMINATION:  Visit Vitals  /66 (BP 1 Location: Left upper arm, BP Patient Position: Sitting, BP Cuff Size: Small adult)   Pulse 80   Temp 97.5 °F (36.4 °C)   Resp 15   Ht 5' 4\" (1.626 m)   Wt 117 lb (53.1 kg)   SpO2 98%   BMI 20.08 kg/m²       General: No acute distress. Eyes: Sclera anicteric. ENT: No oral lesions. Thyroid normal.  Nodes: No adenopathy. Skin: No spider angiomata. No jaundice. No palmar erythema. Respiratory: Lungs clear to auscultation. Cardiovascular: Regular heart rate. No murmurs. No JVD. Abdomen: Soft non-tender. Liver size normal to percussion/palpation. Spleen not palpable. No obvious ascites. Extremities: No edema. No muscle wasting. No gross arthritic changes. Neurologic: Alert and oriented. Cranial nerves grossly intact. No asterixis. LABORATORY STUDIES:  Liver [place] of [address] Units 11/14/2020   WBC 4.6 - 13.2 K/uL 5.2   ANC 1.8 - 8.0 K/UL 3.0   HGB 12.0 - 16.0 g/dL 14.5    - 420 K/uL 268   AST 10 - 38 U/L 12   ALT 13 - 56 U/L 18   Alk Phos 45 - 117 U/L 56   Bili, Total 0.2 - 1.0 MG/DL 0.7   Bili, Direct 0.0 - 0.3 mg/dL    Albumin 3.4 - 5.0 g/dL 4.5   BUN 7.0 - 18 MG/DL 15   Creat 0.6 - 1.3 MG/DL 0.51 (L)   Na 136 - 145 mmol/L 142   K 3.5 - 5.5 mmol/L 4.0   Cl 100 - 111 mmol/L 107   CO2 21 - 32 mmol/L 29   Glucose 74 - 99 mg/dL 78     SEROLOGIES:  Serologies Latest Ref Rng & Units 5/23/2018   Hep A Ab, Total Negative Positive (A)   Hep B Surface Ag None Detec Detected (A)     5/2018. HBEantigen positive, anti-HBE negative, HBV ,000 intU, anti-HCV negative    HBV DNA   5/2019. 13.2  IU/mL  11/2020. < 10  IU/mL    LIVER HISTOLOGY:  5/2018. Shear wave elastography performed at THE [facility]. E Range: 5.31-9.09 kPa, E Mean: 7.53 kPa, E Median: 7.71 kPa, E Std: 1.25 kPa, The results suggested a fibrosis level of F1-2.  7/2019. Shear wave elastography performed at THE [facility]. E Range: 4.74-6.06 kPa, E Mean: 0.59 kPa, E Median: 5.80 kPa, E Std: 0.49 kPa.   The results suggested a fibrosis level of F1.    ENDOSCOPIC PROCEDURES:  Not available or performed    RADIOLOGY:  5/2018. Ultrasound of liver. Echogenic consistent with chronic liver disease. No liver mass lesions. No dilated bile ducts. No ascites. 7/2019. Ultrasound of liver. Echogenic consistent with chronic liver disease. No liver mass lesions. No dilated bile ducts. No ascites. OTHER TESTING:  Not available or performed      FOLLOW-UP:  All of the issues listed above in the Assessment and Plan were discussed with the patient. All questions were answered. The patient expressed a clear understanding of the above. [address] [place] [address] in 6 months for routine monitoring. Fibroscan at next visit.        Erin Williamson, AGPCNP-BC  [address] [place] [address] [address]  [address] [place] [address], [address] [place] [address] Street - [address] [phone]

## 2021-05-11 LAB
AFP L3 MFR SERPL: NORMAL % (ref 0–9.9)
AFP SERPL-MCNC: 2.1 NG/ML (ref 0–8)
ALBUMIN SERPL-MCNC: 4.5 G/DL (ref 3.8–4.8)
ALP SERPL-CCNC: 62 IU/L (ref 39–117)
ALT SERPL-CCNC: 9 IU/L (ref 0–32)
AST SERPL-CCNC: 19 IU/L (ref 0–40)
BASOPHILS # BLD AUTO: 0 X10E3/UL (ref 0–0.2)
BASOPHILS NFR BLD AUTO: 0 %
BILIRUB DIRECT SERPL-MCNC: 0.09 MG/DL (ref 0–0.4)
BILIRUB SERPL-MCNC: 0.2 MG/DL (ref 0–1.2)
BUN SERPL-MCNC: 11 MG/DL (ref 6–24)
BUN/CREAT SERPL: 24 (ref 9–23)
CALCIUM SERPL-MCNC: 9.4 MG/DL (ref 8.7–10.2)
CHLORIDE SERPL-SCNC: 102 MMOL/L (ref 96–106)
CO2 SERPL-SCNC: 24 MMOL/L (ref 20–29)
CREAT SERPL-MCNC: 0.45 MG/DL (ref 0.57–1)
EOSINOPHIL # BLD AUTO: 0.2 X10E3/UL (ref 0–0.4)
EOSINOPHIL NFR BLD AUTO: 3 %
ERYTHROCYTE [DISTWIDTH] IN BLOOD BY AUTOMATED COUNT: 12.5 % (ref 11.7–15.4)
GLUCOSE SERPL-MCNC: 64 MG/DL (ref 65–99)
HCT VFR BLD AUTO: 40.2 % (ref 34–46.6)
HGB BLD-MCNC: 13.8 G/DL (ref 11.1–15.9)
IMM GRANULOCYTES # BLD AUTO: 0 X10E3/UL (ref 0–0.1)
IMM GRANULOCYTES NFR BLD AUTO: 1 %
LYMPHOCYTES # BLD AUTO: 1.7 X10E3/UL (ref 0.7–3.1)
LYMPHOCYTES NFR BLD AUTO: 19 %
MCH RBC QN AUTO: 32.4 PG (ref 26.6–33)
MCHC RBC AUTO-ENTMCNC: 34.3 G/DL (ref 31.5–35.7)
MCV RBC AUTO: 94 FL (ref 79–97)
MONOCYTES # BLD AUTO: 0.5 X10E3/UL (ref 0.1–0.9)
MONOCYTES NFR BLD AUTO: 6 %
NEUTROPHILS # BLD AUTO: 6.3 X10E3/UL (ref 1.4–7)
NEUTROPHILS NFR BLD AUTO: 71 %
PLATELET # BLD AUTO: 248 X10E3/UL (ref 150–450)
POTASSIUM SERPL-SCNC: 4.7 MMOL/L (ref 3.5–5.2)
PROT SERPL-MCNC: 7.2 G/DL (ref 6–8.5)
RBC # BLD AUTO: 4.26 X10E6/UL (ref 3.77–5.28)
SODIUM SERPL-SCNC: 140 MMOL/L (ref 134–144)
SPECIMEN STATUS REPORT, ROLRST: NORMAL
WBC # BLD AUTO: 8.9 X10E3/UL (ref 3.4–10.8)

## 2021-05-12 DIAGNOSIS — B18.1 CHRONIC HEPATITIS B (HCC): Primary | ICD-10-CM

## 2021-05-12 LAB
HBV DNA SERPL NAA+PROBE-ACNC: NORMAL IU/ML
HBV DNA SERPL NAA+PROBE-LOG IU: NORMAL LOG10 IU/ML
REF LAB TEST REF RANGE: NORMAL
SPECIMEN STATUS REPORT, ROLRST: NORMAL

## 2021-05-12 NOTE — PROGRESS NOTES
Labs reviewed. All labs were either normal and/or abnormal values were not clinically meaningful to patient's current visit.

## 2021-05-15 ENCOUNTER — HOSPITAL ENCOUNTER (OUTPATIENT)
Dept: ULTRASOUND IMAGING | Age: 51
Discharge: HOME | End: 2021-05-15
Attending: NURSE PRACTITIONER
Payer: COMMERCIAL

## 2021-05-15 DIAGNOSIS — B18.1 CHRONIC HEPATITIS B (HCC): ICD-10-CM

## 2021-05-15 PROCEDURE — 76981 USE PARENCHYMA: CPT

## 2021-05-19 ENCOUNTER — HOSPITAL ENCOUNTER (OUTPATIENT)
Dept: PHYSICAL THERAPY | Age: 51
Discharge: HOME | End: 2021-05-19
Payer: COMMERCIAL

## 2021-05-19 PROCEDURE — 97112 NEUROMUSCULAR REEDUCATION: CPT | Performed by: PHYSICAL THERAPIST

## 2021-05-19 PROCEDURE — 97110 THERAPEUTIC EXERCISES: CPT | Performed by: PHYSICAL THERAPIST

## 2021-05-19 PROCEDURE — 97140 MANUAL THERAPY 1/> REGIONS: CPT | Performed by: PHYSICAL THERAPIST

## 2021-05-19 NOTE — PROGRESS NOTES
PT DAILY TREATMENT NOTE    Patient Name: Kingsley Castillo  Date:2021  : 1970  [x]  Patient  Verified  Payor: Garrison Parham / Plan: Subhashjsryan 97 / Product Type: LEVI /    In time:1400  Out time:1452  Total Treatment Time (min): 52  Total Timed Codes (min): 52  1:1 Treatment Time ( only): 45   Visit #: 3 of 8-12    Treatment Area: Neck pain [M54.2]    SUBJECTIVE  Pain Level (0-10 scale): 3  Any medication changes, allergies to medications, adverse drug reactions, diagnosis change, or new procedure performed?: [x] No    [] Yes (see summary sheet for update)  Subjective functional status/changes:   [] No changes reported  Pt reports no more facial numbness c/o     Pt feels she is 50% better , doing ex and stretches daily and feels it is helping , motion is better and pain is better . Better to check to rear but still some pain especially rotation to left     OBJECTIVE        25 min Therapeutic Exercise:  [x]? See flow sheet :   Rationale: increase ROM, increase strength and improve coordination to improve the patients ability to increase ease with ADLs        17 min Neuromuscular Re-education:  [x]? See flow sheet :   Rationale: increase strength and increase proprioception  to improve the patients ability to promote postural awareness     10 min Manual Therapy: In supine: manipulation to 3 level thoracic   Sitting and also in supine MET for high cervical alignment    The manual therapy interventions were performed at a separate and distinct time from the therapeutic activities interventions.   Rationale: decrease pain, increase ROM and increase tissue extensibility to ease ADL tolerance          With   [] TE   [] TA   [] neuro   [] other: Patient Education: [x] Review HEP    [] Progressed/Changed HEP based on:   [] positioning   [] body mechanics   [] transfers   [] heat/ice application    [] other:      Other Objective/Functional Measures:   Left rotation C1-3 , T 1-6 left     Manual therapy : manipulation 3 levels thoracic + cavitation and = rotation when done in T1-6  MET for high cervical 2 sets = when done   Pt notes \"no pain now \" with rotation left , still 1/10 mild left SB at high cervical     cerv ROM : 71 deg right , 62 left pre mob , 65 left post   SB 22-25 abigail pre mob , approx 35 deg post mob   Flexion close to chest , extension 76 deg          Pain Level (0-10 scale) post treatment: 0    ASSESSMENT/Changes in Function: pt progressing well increase ROM , increase function report per pt , less pain. Pt responded very well to manual tech and reports no more pain with left cervical rotation following also noted = alignment following. No adverse affect with therapy     Patient will continue to benefit from skilled PT services to modify and progress therapeutic interventions, address functional mobility deficits, address ROM deficits, address strength deficits, analyze and address soft tissue restrictions, analyze and cue movement patterns, analyze and modify body mechanics/ergonomics and assess and modify postural abnormalities to attain remaining goals. []  See Plan of Care  [x]  See progress note/recertification  []  See Discharge Summary         Progress towards goals / Updated goals:    Short Term Goals: To be accomplished in 1-2 weeks:               1. Pt will be independent and compliant with HEP for carryover of strength and mobility gains                   Status at [facility]: Issued at               Current: met, patient reports HEP compliance (5/7/21)                 2. Pt will report >25% improvement in overall neck symptoms for ease of completing ADL's               DNATIO at [facility]: constant symptoms  PN 5/19/2021 : pt reports 50% better since start of therapy , MET     Long Term Goals: To be accomplished in 4-6 weeks:               1.  Pt will score >67/100 on FOTO to show significant improvement in functional mobility and QOL                Status at [facility]: 52/100  Current will assess at 5th appt . , pt reports easier to clear traffic to rear and other functional mobility with daily activity improved .  Progressing 5/19/2021                  2. Pt will report >75% improvement in overall neck symptoms for ease of completing ADL's               OREONJ at [facility]: constant symptoms  PN: 50% , progressing 5/19/2021                  3. Pt will demo cervical rotation AROM >65 on L for improved ability to check blind spots   Status at [facility]: Cervical rotation L 52, R 70  PN 5/19/2021 : rotation 71 deg right , 65 deg left , progressing                  4. Pt will report worst pain as <3/10 for improved activity tolerance and ability to complete work duties               BGGOL at [facility]: worst pain 9/10   PN 5/19/2021 :   Pain at most 6/10 only with end of motion , but day to day activity up to 5/10 , lowest 3/10 progressing      PLAN  [x]  Upgrade activities as tolerated     [x]  Continue plan of care  []  Update interventions per flow sheet       []  Discharge due to:_  []  Other:_      Samantha Padron, PT 5/19/2021  2:19 PM    Future Appointments   Date Time Provider Kelly Lozada   11/11/2021  2:10 PM ANDREA Jimenez AMB

## 2021-05-19 NOTE — PROGRESS NOTES
[facility]  [address], [address]  Ph [phone]  Fx [phone]    Medicare Progress Report    Patient name: Sandra Connelly     Start of Care: 2021  Referral source: Linda Morales MD    : 1970  Medical/Treatment Diagnosis: Neck pain [M54.2]  Onset Date:2021   Prior Hospitalization: see medical history   Provider#: [id number]  Comorbidities: asthma, Hepatitis B    Prior Level of Function: Independent with ADL's, works at [employer] as a [employer]                              Medications: Verified on Patient Summary List    Visits from [facility]: 3    Missed Visits: pt states has been difficult to come more regularly due to her busy schedule and work. Reporting Period : 2021  to 2021       Subjective Reports: Pt feels she is 50% better , doing ex and stretches daily and feels it is helping , motion is better and pain is better . Better to check to rear while driving but still some pain especially rotation to left   Short Term Goals: To be accomplished in 1-2 weeks:               1. Pt will be independent and compliant with HEP for carryover of strength and mobility gains                   Status at [facility]: Issued at               Current: met, patient reports HEP compliance (21)                  2. Pt will report >25% improvement in overall neck symptoms for ease of completing ADL's               UVBONP at [facility]: constant symptoms  PN 2021 : pt reports 50% better since start of therapy , MET      Long Term Goals: To be accomplished in 4-6 weeks:               1. Pt will score >67/100 on FOTO to show significant improvement in functional mobility and QOL                Status at [facility]: 52/100  Current will assess at 5th appt . , pt reports easier to clear traffic to rear and other functional mobility with daily activity improved .  Progressing 2021                   2. Pt will report >75% improvement in overall neck symptoms for ease of completing ADL's               NVYEDQ at [facility]: constant symptoms  PN: 50% , progressing 5/19/2021                   3. Pt will demo cervical rotation AROM >65 on L for improved ability to check blind spots   Status at [facility]: Cervical rotation L 52, R 70  PN 5/19/2021 : rotation 71 deg right , 65 deg left , progressing                   4. Pt will report worst pain as <3/10 for improved activity tolerance and ability to complete work duties               JTWURF at [employer]: worst pain 9/10   PN 5/19/2021 :   Pain at most 6/10 only with end of motion , but day to day activity up to 5/10 , lowest 3/10 progressing      Key functional changes: increase cervical ROM , decrease pain , pt aware of HEP and actively attempting ex at home, increase functional mobility and activity     Problems/ barriers to goal attainment: difficulty making more regular appts . Assessment / Recommendations:pt progressing well despite only able to schedule a few intermittent appts , plan to continue and emphasis consistancy to progress ex . Problem List: pain affecting function, decrease ROM, decrease strength, decrease ADL/ functional abilitiies, decrease activity tolerance and decrease flexibility/ joint mobility   Treatment Plan: Therapeutic exercise, Therapeutic activities, Neuromuscular re-education, Physical agent/modality, Manual therapy, Patient education, Self Care training and Functional mobility training    Patient Goal (s) has been updated and includes: no more pain     Updated Goals to be accomplished in 4 weeks:  Continue toward unment goals     Frequency / Duration: Patient to be seen 2 times per week for 4 weeks: The severity rating is based on clinical judgment and the FOTO score.       Samantha Padron, PT 5/19/2021 4:34 PM

## 2021-05-27 ENCOUNTER — HOSPITAL ENCOUNTER (OUTPATIENT)
Dept: PHYSICAL THERAPY | Age: 51
Discharge: HOME | End: 2021-05-27
Payer: COMMERCIAL

## 2021-05-27 PROCEDURE — 97140 MANUAL THERAPY 1/> REGIONS: CPT

## 2021-05-27 PROCEDURE — 97110 THERAPEUTIC EXERCISES: CPT

## 2021-05-27 NOTE — PROGRESS NOTES
PT DAILY TREATMENT NOTE    Patient Name: Melissa Caceres  Date:2021  : 1970  [x]  Patient  Verified  Payor: Leela Mora / Plan: Humera Quarles / Product Type: LEVI /    In time:2:00  Out time:2:44  Total Treatment Time (min): 44  Total Timed Codes (min): 44  1:1 Treatment Time (MC only): 40   Visit #: 4 of 11    Treatment Area: Neck pain [M54.2]    SUBJECTIVE  Pain Level (0-10 scale): 6/10  Any medication changes, allergies to medications, adverse drug reactions, diagnosis change, or new procedure performed?: [x] No    [] Yes (see summary sheet for update)  Subjective functional status/changes:   [] No changes reported  Pt reports she has pain when turns her head to the left she has pain in the left side. Pt reports she also has pain in her neck when she is looking down and reading. Pt reports after therapy she had increasd pain that night but then it felt better the next day. OBJECTIVE      34 min Therapeutic Exercise:  [x] See flow sheet : pt required tactile and verbal cuing for correct form with exercises. Rationale: increase ROM, increase strength and increase proprioception to improve the patients ability to increase patient's ease with performing functional activities and decrease overall pain and symptoms. 10 min Manual Therapy:  Trigger point release to left upper trap and levator scap. SOR. MFR to left cervical extensors. Rationale: decrease pain, increase ROM, increase tissue extensibility and decrease trigger points to improve the patients ability to perform functional activities with decreased pain. The manual therapy interventions were performed at a separate and distinct time from the therapeutic activities interventions.            With   [x] TE   [] TA   [] neuro   [] other: Patient Education: [x] Review HEP    [] Progressed/Changed HEP based on:   [] positioning   [] body mechanics   [] transfers   [] heat/ice application    [] other:      Other Objective/Functional Measures: left cervical rotation pre manual: 56 degrees and pain, post manual : 66 degrees and no pain. Pain Level (0-10 scale) post treatment: 0/10    ASSESSMENT/Changes in Function: Patient tolerated treatment well with no adverse reactions today. Patient was educated to prop her book up when she is reading and to take breaks when she is reading. Patient tolerated manual well and reported decreased tension following and demonstrated improved ROM. Although showing progress patient would benefit from continuation of skilled physical therapy to address the remaining limitations. Patient will continue to benefit from skilled PT services to modify and progress therapeutic interventions, address functional mobility deficits, address ROM deficits, address strength deficits, analyze and address soft tissue restrictions, analyze and cue movement patterns, analyze and modify body mechanics/ergonomics and assess and modify postural abnormalities to attain remaining goals. []  See Plan of Care  []  See progress note/recertification  []  See Discharge Summary         Progress towards goals / Updated goals:  Short Term Goals: To be accomplished in 1-2 weeks:               1. Pt will be independent and compliant with HEP for carryover of strength and mobility gains                   Status at [facility]: Issued at               Current: met, patient reports HEP compliance (5/7/21)                  2. Pt will report >25% improvement in overall neck symptoms for ease of completing ADL's               UKMCLU at [facility]: constant symptoms  PN 5/19/2021 : pt reports 50% better since start of therapy , MET      Long Term Goals: To be accomplished in 4-6 weeks:               1. Pt will score >67/100 on FOTO to show significant improvement in functional mobility and QOL                Status at [facility]: 52/100  Current will assess at 5th appt .  , pt reports easier to clear traffic to rear and other functional mobility with daily activity improved .  Progressing 5/19/2021                   2. Pt will report >75% improvement in overall neck symptoms for ease of completing ADL's               DDYKRU at [facility]: constant symptoms  PN: 50% , progressing 5/19/2021                   3. Pt will demo cervical rotation AROM >65 on L for improved ability to check blind spots   Status at [facility]: Cervical rotation L 52, R 70  PN 5/19/2021 : rotation 71 deg right , 65 deg left , progressing  Current: left rotation 66 degrees 5/27/21                   4. Pt will report worst pain as <3/10 for improved activity tolerance and ability to complete work duties               STTLLG at [employer]: worst pain 9/10   PN 5/19/2021 :   Pain at most 6/10 only with end of motion , but day to day activity up to 5/10 , lowest 3/10 progressing        PLAN  [x]  Upgrade activities as tolerated     [x]  Continue plan of care  [x]  Update interventions per flow sheet       []  Discharge due to:_  []  Other:_      Lisa Woody, PT 5/27/2021  2:12 PM    Future Appointments   Date Time Provider Kelly Lozada   6/1/2021  2:00 PM [facility]   6/8/2021  2:00 PM [facility]   11/11/2021  2:10 PM ANDREA Yeh AMB

## 2021-06-01 ENCOUNTER — HOSPITAL ENCOUNTER (OUTPATIENT)
Dept: PHYSICAL THERAPY | Age: 51
Discharge: HOME | End: 2021-06-01
Payer: COMMERCIAL

## 2021-06-01 PROCEDURE — 97110 THERAPEUTIC EXERCISES: CPT | Performed by: PHYSICAL THERAPIST

## 2021-06-01 PROCEDURE — 97140 MANUAL THERAPY 1/> REGIONS: CPT | Performed by: PHYSICAL THERAPIST

## 2021-06-01 NOTE — PROGRESS NOTES
PT DAILY TREATMENT NOTE    Patient Name: Goran Perez  Date:2021  : 1970  [x]  Patient  Verified  Payor: Venkat Galan / Plan: [address] / Product Type: LEVI /    In time: 1400 Out time:1450  Total Treatment Time (min): 50  Total Timed Codes (min): 50  1:1 Treatment Time ( only): 45   Visit #: 5 of 11    Treatment Area: Neck pain [M54.2]    SUBJECTIVE  Pain Level (0-10 scale): 1  Any medication changes, allergies to medications, adverse drug reactions, diagnosis change, or new procedure performed?: [x] No    [] Yes (see summary sheet for update)  Subjective functional status/changes:   [] No changes reported  50% better , still pain left side of neck less pain with some movements , less intensity and turn to left side more easily   yesterday increase pain to 8/10 at end of day after washing dishes for 3-4 hours and standing a long time , this morning sore 3-10 , 1/10 by mid day therapy       OBJECTIVE  35 min Therapeutic Exercise:  [x]? See flow sheet : pt required tactile and verbal cuing for correct form with exercises. Rationale: increase ROM, increase strength and increase proprioception to improve the patients ability to increase patient's ease with performing functional activities and decrease overall pain and symptoms.    15 min Manual Therapy: MET for high cervical alignment , assisted ROM cerv retract extend , PA mob to C1-2 , mob with movement to T2 during cervical rotation , PA mob to T2 into extension , manipulation to 3 level thoracic mild cavitation     Rationale: decrease pain, increase ROM, increase tissue extensibility and decrease trigger points to improve the patients ability to perform functional activities with decreased pain. The manual therapy interventions were performed at a separate and distinct time from the therapeutic activities interventions.           With   [] TE   [] TA   [] neuro   [] other: Patient Education: [x] Review HEP    [] Progressed/Changed HEP based on:   [] positioning   [] body mechanics   [] transfers   [] heat/ice application    [] other:      Other Objective/Functional Measures: FOTO : 61/100 ( 9 point increase )     Manual Therapy: MET for high cervical alignment , assisted ROM cerv retract extend , PA mob to C1-2 , mob with movement to T2 during cervical rotation , PA mob to T2 into extension , manipulation to 3 level thoracic mild cavitation       Cervical rotation initial 67 to left , 80 right , following manual decrease end range pain and increase left rotation to 72 deg     Pain Level (0-10 scale) post treatment: 1    ASSESSMENT/Changes in Function:    pt progressing well with ex only minimal cues with posture . Demonstrates good ROM increase cervical rotation following manual tech for left rotation . No adverse affect with therapy . Patient will continue to benefit from skilled PT services to modify and progress therapeutic interventions, address functional mobility deficits, address ROM deficits, address strength deficits, analyze and address soft tissue restrictions, analyze and cue movement patterns, analyze and modify body mechanics/ergonomics and assess and modify postural abnormalities to attain remaining goals. []  See Plan of Care  [x]  See progress note/recertification 2/06/48  []  See Discharge Summary         Progress towards goals / Updated goals:  Short Term Goals: To be accomplished in 1-2 weeks:               1. Pt will be independent and compliant with HEP for carryover of strength and mobility gains                   Status at [facility]: Issued at               Current: met, patient reports HEP compliance (5/7/21)                  2. Pt will report >25% improvement in overall neck symptoms for ease of completing ADL's               DEDSDZ at [facility]: constant symptoms  PN 5/19/2021 : pt reports 50% better since start of therapy , MET      Long Term Goals: To be accomplished in 4-6 weeks:               1.  Pt will score >67/100 on FOTO to show significant improvement in functional mobility and Mitra Likes               QMCCIC at [facility]: 52/100  PN 5/19/21 : pt reports easier to clear traffic to rear and other functional mobility with daily activity improved . Progressing   Current : 6/1/2021 61/100 ( 9 point increase )                    2. Pt will report >75% improvement in overall neck symptoms for ease of completing ADL's               HEOYGJ at [facility]: constant symptoms  PN: 50% , progressing 5/19/2021                   3. Pt will demo cervical rotation AROM >65 on L for improved ability to check blind spots   Status at [facility]: Cervical rotation L 52, R 70  PN 5/19/2021 : rotation 71 deg right , 65 deg left , progressing  Current: Cervical rotation initial today 67 to left , 80 right , following manual decrease end range pain and increase left rotation to 72 deg , progressing 6/1/2021                       4. Pt will report worst pain as <3/10 for improved activity tolerance and ability to complete work duties               SFOMBF at [facility]: worst pain 9/10   PN 5/19/2021 :Keiko Mall at most 6/10 only with end of motion , but day to day activity up to 5/10 , lowest 3/10 progressing   Current : with significant increase repeditive activity washing dishes for hours increase pain to 8/10 yesterday but today decrease to 1/10 by mid day .  6/1/2021        PLAN  [x]  Upgrade activities as tolerated     [x]  Continue plan of care  []  Update interventions per flow sheet       []  Discharge due to:_  []  Other:_      Car Muhammad, PT 6/1/2021  2:16 PM    Future Appointments   Date Time Provider Kelly Lozada   6/8/2021  2:00 PM [facility] PT RES CTR 2 [facility]   11/11/2021  2:10 PM ANDREA Sarmiento BS AMB

## 2021-06-08 ENCOUNTER — HOSPITAL ENCOUNTER (OUTPATIENT)
Dept: PHYSICAL THERAPY | Age: 51
Discharge: HOME | End: 2021-06-08
Payer: COMMERCIAL

## 2021-06-08 PROCEDURE — 97140 MANUAL THERAPY 1/> REGIONS: CPT

## 2021-06-08 PROCEDURE — 97110 THERAPEUTIC EXERCISES: CPT

## 2021-06-08 NOTE — PROGRESS NOTES
PT DAILY TREATMENT NOTE     Patient Name: Abisai Romero  Date:2021  : 1970  [x]  Patient  Verified  Payor: Mingo Ahuja / Plan: Radha Forte / Product Type: LEVI /    In time:200  Out time:245  Total Treatment Time (min): 45  Visit #: 6 of 11    Medicare/BCBS Only   Total Timed Codes (min):  45 1:1 Treatment Time:  40       Treatment Area: Neck pain [M54.2]    SUBJECTIVE  Pain Level (0-10 scale): 3  Any medication changes, allergies to medications, adverse drug reactions, diagnosis change, or new procedure performed?: [x] No    [] Yes (see summary sheet for update)  Subjective functional status/changes:   [] No changes reported  Patient denied pain upon arrival    OBJECTIVE      35 min Therapeutic Exercise:  [] See flow sheet :   Rationale: increase ROM and increase strength to improve the patients ability to perform ADLs        10 min Manual Therapy:  SOR, TPR left UT, LS and scalenes. Left first rib mob and c/s and upper t/s PA mobs in supine. The manual therapy interventions were performed at a separate and distinct time from the therapeutic activities interventions. Rationale: increase ROM, increase tissue extensibility and decrease trigger points to perform ADLs          With   [] TE   [] TA   [] neuro   [] other: Patient Education: [x] Review HEP    [] Progressed/Changed HEP based on:   [] positioning   [] body mechanics   [] transfers   [] heat/ice application    [] other:      Other Objective/Functional Measures: progressed therex per flow sheet     Presented with elevated 1st rib on left    Reviewed diaphragmatic breathing during treatment    Pain Level (0-10 scale) post treatment: 2    ASSESSMENT/Changes in Function: progressed therex with good tolerance and no c/o increased pain and good mechanics.     Patient will continue to benefit from skilled PT services to modify and progress therapeutic interventions, address functional mobility deficits, address ROM deficits, address strength deficits, analyze and address soft tissue restrictions and analyze and cue movement patterns to attain remaining goals. [x]  See Plan of Care  []  See progress note/recertification  []  See Discharge Summary         Progress towards goals / Updated goals:  Short Term Goals: To be accomplished in 1-2 weeks:               1. Pt will be independent and compliant with HEP for carryover of strength and mobility gains                   Status at [facility]: Issued at               Current: met, patient reports HEP compliance (5/7/21)                  2. Pt will report >25% improvement in overall neck symptoms for ease of completing ADL's               XALKDP at [facility]: constant symptoms  PN 5/19/2021 : pt reports 50% better since start of therapy , MET      Long Term Goals: To be accomplished in 4-6 weeks:               1. Pt will score >67/100 on FOTO to show significant improvement in functional mobility and Lashonda Narrow               ULFMPM at [facility]: 52/100  PN 5/19/21 : pt reports easier to clear traffic to rear and other functional mobility with daily activity improved .  Progressing   Current : 6/1/2021 61/100 ( 9 point increase )                    2. Pt will report >75% improvement in overall neck symptoms for ease of completing ADL's               YBTHFF at [facility]: constant symptoms  PN: 50% , progressing 5/19/2021                   3. Pt will demo cervical rotation AROM >65 on L for improved ability to check blind spots   Status at [facility]: Cervical rotation L 52, R 70  PN 5/19/2021 : rotation 71 deg right , 65 deg left , progressing  Current: Cervical rotation initial today 67 to left , 80 right , following manual decrease end range pain and increase left rotation to 72 deg , progressing 6/1/2021                         4. Pt will report worst pain as <3/10 for improved activity tolerance and ability to complete work duties               LVDTJB at [employer]: worst pain 9/10   PN 5/19/2021 :Jackie Appanoose at most 6/10 only with end of motion , but day to day activity up to 5/10 , lowest 3/10 progressing   Current : with significant increase repeditive activity washing dishes for hours increase pain to 8/10 yesterday but today decrease to 1/10 by mid day .  6/1/2021        PLAN  []  Upgrade activities as tolerated     [x]  Continue plan of care  []  Update interventions per flow sheet       []  Discharge due to:_  []  Other:_      Nonnie Elder, PTA 6/8/2021  2:13 PM    Future Appointments   Date Time Provider Kelly Lozada   6/17/2021  2:45 PM [facility] PT RES CTR 2 [facility]   6/21/2021  4:15 PM [facility]   7/6/2021  2:00 PM [facility]   7/13/2021  2:00 PM [facility]   7/20/2021  2:00 PM [facility]   7/27/2021  2:00 PM [facility]   11/11/2021  2:10 PM ANDREA Rondon AMB

## 2021-06-17 ENCOUNTER — HOSPITAL ENCOUNTER (OUTPATIENT)
Dept: PHYSICAL THERAPY | Age: 51
Discharge: HOME | End: 2021-06-17
Payer: COMMERCIAL

## 2021-06-17 PROCEDURE — 97140 MANUAL THERAPY 1/> REGIONS: CPT

## 2021-06-17 PROCEDURE — 97110 THERAPEUTIC EXERCISES: CPT

## 2021-06-17 NOTE — PROGRESS NOTES
PT DAILY TREATMENT NOTE     Patient Name: Nam Echavarria  Date:2021  : 1970  [x]  Patient  Verified  Payor: Sim Caballero / Plan: Sammi Hidalgo / Product Type: LEVI /    In time:252  Out time:330  Total Treatment Time (min): 38  Visit #: 7 of 11    Medicare/BCBS Only   Total Timed Codes (min):  38 1:1 Treatment Time:  38       Treatment Area: Neck pain [M54.2]    SUBJECTIVE  Pain Level (0-10 scale): 2-3/10  Any medication changes, allergies to medications, adverse drug reactions, diagnosis change, or new procedure performed?: [x] No    [] Yes (see summary sheet for update)  Subjective functional status/changes:   [] No changes reported  Patient reported continued left sided neck pain    OBJECTIVE    28 min Therapeutic Exercise:  [x] See flow sheet :   Rationale: increase ROM and increase strength to improve the patients ability to perform ADLs      10 min Manual Therapy:  SOR, TPR and DTM B UT/LS, manual stretching into B cervical rotation and SB, gentle PA mobs c/s and t/s    The manual therapy interventions were performed at a separate and distinct time from the therapeutic activities interventions. Rationale: decrease pain, increase ROM, increase tissue extensibility and decrease trigger points to perform ADLs         With   [] TE   [] TA   [] neuro   [] other: Patient Education: [x] Review HEP    [] Progressed/Changed HEP based on:   [] positioning   [] body mechanics   [] transfers   [] heat/ice application    [] other:      Other Objective/Functional Measures: progressed therex per flow sheet     Pain Level (0-10 scale) post treatment: 1    ASSESSMENT/Changes in Function: patient tolerated progressed therex well without c/o increased pain.     Cervical AROM right rotation 70 degs, left 67 degs    Patient will continue to benefit from skilled PT services to modify and progress therapeutic interventions, address functional mobility deficits, address ROM deficits, address strength deficits, analyze and address soft tissue restrictions and analyze and cue movement patterns to attain remaining goals. [x]  See Plan of Care  []  See progress note/recertification  []  See Discharge Summary         Progress towards goals / Updated goals:  Short Term Goals: To be accomplished in 1-2 weeks:               1. Pt will be independent and compliant with HEP for carryover of strength and mobility gains                   Status at [facility]: Issued at IE              Current: met, patient reports HEP compliance (5/7/21)                  2. Pt will report >25% improvement in overall neck symptoms for ease of completing ADL's               TTPUCA at [facility]: constant symptoms  PN 5/19/2021 : pt reports 50% better since start of therapy , MET      Long Term Goals: To be accomplished in 4-6 weeks:               1. Pt will score >67/100 on FOTO to show significant improvement in functional mobility and Merino Lover               SDALNW at [facility]: 52/100  PN 5/19/21 : pt reports easier to clear traffic to rear and other functional mobility with daily activity improved .  Progressing   Current : 6/1/2021 61/100 ( 9 point increase )                    2. Pt will report >75% improvement in overall neck symptoms for ease of completing ADL's               BXIPIJ at [facility]: constant symptoms  PN: 50% , progressing 5/19/2021                   3. Pt will demo cervical rotation AROM >65 on L for improved ability to check blind spots   Status at [facility]: Cervical rotation L 52, R 70  PN 5/19/2021 : rotation 71 deg right , 65 deg left , progressing  Current: Cervical rotation initial today 67 to left , 80 right , following manual decrease end range pain and increase left rotation to 72 deg , progressing 6/1/2021; cervical rotation right 70 degs, left 67 degs (6/17/21)                         4. Pt will report worst pain as <3/10 for improved activity tolerance and ability to complete work duties               CNQUFP at [employer]: worst pain 9/10   PN 5/19/2021 :Lang Husbands at most 6/10 only with end of motion , but day to day activity up to 5/10 , lowest 3/10 progressing   Current : with significant increase repeditive activity washing dishes for hours increase pain to 8/10 yesterday but today decrease to 1/10 by mid day .  6/1/2021     PLAN  [x]  Upgrade activities as tolerated     [x]  Continue plan of care  []  Update interventions per flow sheet       []  Discharge due to:_  []  Other:_      Teofilo Carter PTA 6/17/2021  3:01 PM    Future Appointments   Date Time Provider Kelly Lozada   6/21/2021  4:15 PM [facility]   7/6/2021  2:00 PM [facility]   7/13/2021  2:00 PM [facility]   7/20/2021  2:00 PM [facility]   7/27/2021  2:00 PM [facility]   11/11/2021  2:10 PM ANDREA Amaral AMB

## 2021-06-21 ENCOUNTER — HOSPITAL ENCOUNTER (OUTPATIENT)
Dept: PHYSICAL THERAPY | Age: 51
Discharge: HOME | End: 2021-06-21
Payer: COMMERCIAL

## 2021-06-21 PROCEDURE — 97110 THERAPEUTIC EXERCISES: CPT

## 2021-06-21 PROCEDURE — 97530 THERAPEUTIC ACTIVITIES: CPT

## 2021-06-21 PROCEDURE — 97112 NEUROMUSCULAR REEDUCATION: CPT

## 2021-06-21 NOTE — PROGRESS NOTES
[facility] at [facility]  [address], [address] [facility]  Ph [phone]  Fx [phone]    Physical Therapy Progress Note  Patient name: Lady Vang Start of Care: 2021   Referral source: Javier Tovar MD : 1970   Medical/Treatment Diagnosis: Neck pain [M54.2] Onset Date:2021   Prior Hospitalization: see medical history Provider#: [id number]   Medications: Verified on Patient Summary List    Comorbidities: asthma, Hepatitis B   Prior Level of Function:Independent with ADL's, works at a restaurant as a cook    Visits from [facility] Care: 8    Missed Visits: 0    Goals/Measure of Progress:    Short Term Goals: To be accomplished in [address] [place] [address]:               1. Pt will be independent and compliant with HEP for carryover of strength and mobility gains                   Status at Eval: Issued at               Current: met, patient reports HEP compliance (21)                  2. Pt will report >25% improvement in overall neck symptoms for ease of completing ADL's               HGCAOL at [facility]: constant symptoms  PN 2021 : pt reports 50% better since start of therapy , MET      Long Term Goals: To be accomplished in 4-6 weeks:               1. Pt will score >67/100 on FOTO to show significant improvement in functional mobility and Jazmyne                CYCMVM at Eval: 52/100  PN 21 : pt reports easier to clear traffic to rear and other functional mobility with daily activity improved .  Progressing   Current : 2021 61/100 ( 9 point increase )                    2. Pt will report >75% improvement in overall neck symptoms for ease of completing ADL's               QKTIZJ at [facility]: constant symptoms  PN: 50% , progressing 2021   Current: Pt reports 75% improvement 21                  3. Pt will demo cervical rotation AROM >65 on L for improved ability to check blind spots   Status at Eval: Cervical rotation L 52, R 70  PN 2021 : rotation 71 deg right , 65 deg left , progressing  Current: CROM: Flexion: 52, Extension: 44 Rotation: Left: 71, Right: 66, Side bending: Left: 42, Right 41 6/21/21                      4. Pt will report worst pain as <3/10 for improved activity tolerance and ability to complete work duties               OSHIOM at [employer]: worst pain 9/10   PN 5/19/2021 :Jarred January at most 6/10 only with end of motion , but day to day activity up to 5/10 , lowest 3/10 progressing   Current : Pt reports 5/10 at worst 6/21/21    Key Functional Changes: Pt has made progress with general neck pain since beginning therapy. Pt reports decreased neck pain at work and with daily activities. Pt reports minor pain at home after workday but not above tolerance. Pt reports they feel confident about progressing to independent HEP at this time. Pt will attend a final visit 2 weeks from today in order to determine pt is ready for D/C. Updated Goals: To be achieved in 4 weeks:        ASSESSMENT/RECOMMENDATIONS:  [x]Continue therapy per initial plan/protocol at a frequency of  2 x per week for 4 weeks  []Continue therapy with the following recommended changes:   _____________________ _____________________________ _________________________________  []Discontinue therapy progressing towards or have reached established goals  []Discontinue therapy due to lack of appreciable progress towards goals  []Discontinue therapy due to lack of attendance or compliance  []Await Physician's recommendations/decisions regarding therapy  []Other:   _____________________ _____________________________ _________________________________  Thank you for this referral.     LPTA Signature:  Fortino Carias PTA  Date: 6/21/2021   PT Signature:  Time: 7:12 PM       NOTE TO PHYSICIAN:  PLEASE COMPLETE THE ORDERS BELOW AND   FAX TO [facility]: [phone]  If you are unable to process this request in 24 hours please contact our office: [phone]      ____ I have read the above report and request that my patient continue therapy with the following changes/special instructions:  ____ I have read the above report and request that my patient be discharged from therapy    Physician's Signature:____________Date:_________TIME:________    ** Signature, Date and Time must be completed for valid certification **

## 2021-06-21 NOTE — PROGRESS NOTES
PT DAILY TREATMENT NOTE    Patient Name: Salma Valdez  Date:2021  : 1970  [x]  Patient  Verified  Payor: Linette Earing / Plan: Katie 97 / Product Type: LEVI /    In time:4:15  Out time:4:59  Total Treatment Time (min): 44  Total Timed Codes (min): 44  1:1 Treatment Time ( only): 40   Visit #: 8 of 11    Treatment Area: Neck pain [M54.2]    SUBJECTIVE  Pain Level (0-10 scale): 2/10  Any medication changes, allergies to medications, adverse drug reactions, diagnosis change, or new procedure performed?: [x] No    [] Yes (see summary sheet for update)  Subjective functional status/changes:   [] No changes reported  \"I definitely feel better. I think I am close to being done. \"    OBJECTIVE        22 min Therapeutic Exercise:  [x] See flow sheet :   Rationale: increase ROM, increase strength and improve coordination to improve the patients ability to return to prior level of physical activity. 10 min Therapeutic Activity:  [x]  See flow sheet :   Rationale: increase ROM, increase strength and improve coordination  to improve the patients ability to return to prior level of physical activity. 12 min Neuromuscular Re-education:  [x]  See flow sheet :   Rationale: increase ROM, increase strength and improve coordination  to improve the patients ability to return to prior level of physical activity. With   [] TE   [] TA   [] neuro   [] other: Patient Education: [x] Review HEP    [] Progressed/Changed HEP based on:   [] positioning   [] body mechanics   [] transfers   [] heat/ice application    [] other:      Other Objective/Functional Measures:      Pain Level (0-10 scale) post treatment: 1/10    ASSESSMENT/Changes in Function: Pt has made progress with general neck pain since beginning therapy. Pt reports decreased neck pain at work and with daily activities. Pt reports minor pain at home after workday but not above tolerance.  Pt reports they feel confident about progressing to independent HEP at this time. Pt will attend a final visit 2 weeks from today in order to determine pt is ready for D/C. Patient will continue to benefit from skilled PT services to modify and progress therapeutic interventions, address functional mobility deficits, address ROM deficits, address strength deficits, analyze and address soft tissue restrictions, analyze and cue movement patterns, analyze and modify body mechanics/ergonomics and assess and modify postural abnormalities to attain remaining goals. []  See Plan of Care  []  See progress note/recertification  []  See Discharge Summary         Progress towards goals / Updated goals:  Short Term Goals: To be accomplished in 1-2 weeks:               1. Pt will be independent and compliant with HEP for carryover of strength and mobility gains                   Status at Eval: Issued at               Current: met, patient reports HEP compliance (5/7/21)                  2. Pt will report >25% improvement in overall neck symptoms for ease of completing ADL's               LKCTXE at Eval: constant symptoms  PN 5/19/2021 : pt reports 50% better since start of therapy , MET      Long Term Goals: To be accomplished in 4-6 weeks:               1. Pt will score >67/100 on FOTO to show significant improvement in functional mobility and Savannah Gutting               CLBBNS at Eval: 52/100  PN 5/19/21 : pt reports easier to clear traffic to rear and other functional mobility with daily activity improved .  Progressing   Current : 6/1/2021 61/100 ( 9 point increase )                    2. Pt will report >75% improvement in overall neck symptoms for ease of completing ADL's               QIYSYU at Eval: constant symptoms  PN: 50% , progressing 5/19/2021   Current: Pt reports 75% improvement 6/21/21                  3. Pt will demo cervical rotation AROM >65 on L for improved ability to check blind spots   Status at Eval: Cervical rotation L 52, R 70  PN 5/19/2021 : rotation 71 deg right , 65 deg left , progressing  Current: CROM: Flexion: 52, Extension: 44 Rotation: Left: 71, Right: 66, Side bending: Left: 42, Right 41 6/21/21                      4. Pt will report worst pain as <3/10 for improved activity tolerance and ability to complete work duties               KTUQOZ at [employer]: worst pain 9/10   PN 5/19/2021 :Stephanie Yohan at most 6/10 only with end of motion , but day to day activity up to 5/10 , lowest 3/10 progressing   Current : Pt reports 5/10 at worst 6/21/21  PLAN  [x]  Upgrade activities as tolerated     [x]  Continue plan of care  []  Update interventions per flow sheet       []  Discharge due to:_  []  Other:_      Edenilson Roche PTA 6/21/2021  4:21 PM    Future Appointments   Date Time Provider Kelly Lozada   7/6/2021  2:00 PM [facility]   7/13/2021  2:00 PM [facility]   7/20/2021  2:00 PM [facility]   7/27/2021  2:00 PM [facility]   11/11/2021  2:10 PM ANDREA Bojorquez AMB

## 2021-07-06 ENCOUNTER — HOSPITAL ENCOUNTER (OUTPATIENT)
Dept: PHYSICAL THERAPY | Age: 51
Discharge: HOME | End: 2021-07-06
Payer: COMMERCIAL

## 2021-07-06 PROCEDURE — 97110 THERAPEUTIC EXERCISES: CPT

## 2021-07-06 PROCEDURE — 97112 NEUROMUSCULAR REEDUCATION: CPT

## 2021-07-06 NOTE — PROGRESS NOTES
PT DAILY TREATMENT NOTE    Patient Name: Justyn Santos  Date:2021   : 1970  [x]  Patient  Verified  Payor: Lam Gutiérrez / Plan: Subhashjsryan 97 / Product Type: LEVI /    In time:2:00  Out time:2:45  Total Treatment Time (min): 45  Total Timed Codes (min): 45  1:1 Treatment Time ([facility] only): 45   Visit #: 14 of 17    Treatment Area: Neck pain [M54.2]    SUBJECTIVE  Pain Level (0-10 scale): 2/10  Any medication changes, allergies to medications, adverse drug reactions, diagnosis change, or new procedure performed?: [x] No    [] Yes (see summary sheet for update)  Subjective functional status/changes:   [] No changes reported  \"I have a little pain right now. \"    OBJECTIVE    33 min Therapeutic Exercise:  [x] See flow sheet :   Rationale: increase ROM, increase strength and improve coordination to improve the patients ability to return to prior level of physical activity. 12 min Neuromuscular Re-education:  [x]  See flow sheet :   Rationale: increase ROM, increase strength and improve coordination  to improve the patients ability to return to prior level of physical activity. With   [] TE   [] TA   [] neuro   [] other: Patient Education: [x] Review HEP    [] Progressed/Changed HEP based on:   [] positioning   [] body mechanics   [] transfers   [] heat/ice application    [] other:      Other Objective/Functional Measures:      Pain Level (0-10 scale) post treatment: 0/10    ASSESSMENT/Changes in Function: Pt tolerated session well with no increased pain. Pt continues to be challenged by therex but is progressing with resistance levels and performance tolerance. Pt reported no pain post tx.      Patient will continue to benefit from skilled PT services to modify and progress therapeutic interventions, address functional mobility deficits, address ROM deficits, address strength deficits, analyze and address soft tissue restrictions, analyze and cue movement patterns, analyze and modify body mechanics/ergonomics and assess and modify postural abnormalities to attain remaining goals. []  See Plan of Care  []  See progress note/recertification  []  See Discharge Summary         Progress towards goals / Updated goals:  Short Term Goals: To be accomplished in 1-2 weeks:               1. Pt will be independent and compliant with HEP for carryover of strength and mobility gains                   Status at Eval: Issued at IE              Last PN: met, patient reports HEP compliance (5/7/21)                  2. Pt will report >25% improvement in overall neck symptoms for ease of completing ADL's               KYIREF at [facility]: constant symptoms  PN 5/19/2021 : pt reports 50% better since start of therapy , MET      Long Term Goals: To be accomplished in 4-6 weeks:               1.  Pt will score >67/100 on FOTO to show significant improvement in functional mobility and QOL                Status at [facility]: 52/100  PN 5/19/21 6/1/2021 61/100 ( 9 point increase )    Current : NA                  2. Pt will report >75% improvement in overall neck symptoms for ease of completing ADL's               ITVOPO at Eval: constant symptoms  PN: Pt reports 75% improvement 6/21/21  Current:  NA                 3. Pt will demo cervical rotation AROM >65 on L for improved ability to check blind spots   Status at [facility]: Cervical rotation L 52, R 70  PN: CROM: Flexion: 52, Extension: 44 Rotation: Left: 71, Right: 66, Side bending: Left: 42, Right 41 6/21/21   Current: NA                     4. Pt will report worst pain as <3/10 for improved activity tolerance and ability to complete work duties               CQZRHONDAK at [employer]: worst pain 9/10   PN 5/19/2021 : Pt reports 5/10 at worst 6/21/21  Current : 4/10 at worst in past week 7/6/21    PLAN  [x]  Upgrade activities as tolerated     [x]  Continue plan of care  []  Update interventions per flow sheet       []  Discharge due to:_  []  Other:_      Hola Suazo PTA 7/6/2021  2:14 PM    Future Appointments   Date Time Provider Kelly Lozada   7/13/2021  2:00 PM [facility]   7/20/2021  2:00 PM [facility]   7/27/2021  2:00 PM [facility]   11/11/2021  2:10 PM ANDREA Cope AMB

## 2021-07-13 ENCOUNTER — HOSPITAL ENCOUNTER (OUTPATIENT)
Dept: PHYSICAL THERAPY | Age: 51
Discharge: HOME | End: 2021-07-13
Payer: COMMERCIAL

## 2021-07-13 PROCEDURE — 97530 THERAPEUTIC ACTIVITIES: CPT

## 2021-07-13 PROCEDURE — 97112 NEUROMUSCULAR REEDUCATION: CPT

## 2021-07-13 PROCEDURE — 97110 THERAPEUTIC EXERCISES: CPT

## 2021-07-13 NOTE — PROGRESS NOTES
PT DAILY TREATMENT NOTE    Patient Name: Shaina Cummings  Date:2021  : 1970  [x]  Patient  Verified  Payor: Agustina Gabo / Plan: Katie 97 / Product Type: LEVI /    In time:2:00  Out time:2:54  Total Treatment Time (min): 54  Total Timed Codes (min): 54  1:1 Treatment Time ( only): 47   Visit #: 10 of 17    Treatment Dx: Neck pain [M54.2]    SUBJECTIVE  Pain Level (0-10 scale): 3/10  Any medication changes, allergies to medications, adverse drug reactions, diagnosis change, or new procedure performed?: [x] No    [] Yes (see summary sheet for update)  Subjective functional status/changes:   [] No changes reported  \"I have some pain today but not too bad. \"    OBJECTIVE        34 min Therapeutic Exercise:  [x] See flow sheet :   Rationale: increase ROM, increase strength and improve coordination to improve the patients ability to return to prior level of physical activity. 10 min Therapeutic Activity:  [x]  See flow sheet :   Rationale: increase ROM, increase strength and improve coordination  to improve the patients ability to return to prior level of physical activity. 10 min Neuromuscular Re-education:  [x]  See flow sheet :   Rationale: increase ROM, increase strength and improve coordination  to improve the patients ability to return to prior level of physical activity. With   [] TE   [] TA   [] neuro   [] other: Patient Education: [x] Review HEP    [] Progressed/Changed HEP based on:   [] positioning   [] body mechanics   [] transfers   [] heat/ice application    [] other:      Other Objective/Functional Measures: FOTO:73    Pain Level (0-10 scale) post treatment: 0/10    ASSESSMENT/Changes in Function: Pt tolerated session well. Pt continues to progress with general strengthening and resistance exercses. Pt reported no increased pain with therex an reports good tolerance to HEP. Pt reports they would like to begin transition to D/C.  PTA recommended two weeks of independent HEP performance to determine if patient was ready for D/C. Pt will return to therapy in two weeks for possible D/C pending status. Patient will continue to benefit from skilled PT services to modify and progress therapeutic interventions, address functional mobility deficits, address ROM deficits, address strength deficits, analyze and address soft tissue restrictions, analyze and cue movement patterns, analyze and modify body mechanics/ergonomics and assess and modify postural abnormalities to attain remaining goals. [x]  See Plan of Care  []  See progress note/recertification  []  See Discharge Summary         Progress towards goals / Updated goals:  Short Term Goals: To be accomplished in 1-2 weeks:               1. Pt will be independent and compliant with HEP for carryover of strength and mobility gains                   Status at Eval: Issued at IE              Last PN: met, patient reports HEP compliance (5/7/21)                  2. Pt will report >25% improvement in overall neck symptoms for ease of completing ADL's               KVUYZL at Eval: constant symptoms  PN 5/19/2021 : pt reports 50% better since start of therapy , MET      Long Term Goals: To be accomplished in 4-6 weeks:               1.  Pt will score >67/100 on FOTO to show significant improvement in functional mobility and Carl Barbone               Status at Eval: 52/100  PN 5/19/21 6/1/2021 61/100 ( 9 point increase )    Current : 73 7/13/21 Met                  2. Pt will report >75% improvement in overall neck symptoms for ease of completing ADL's               BOKFWQ at Eval: constant symptoms  PN: Pt reports 75% improvement 6/21/21  Current:  NA                  3. Pt will demo cervical rotation AROM >65 on L for improved ability to check blind spots   Status at Eval: Cervical rotation L 52, R 70  PN: CROM: Flexion: 52, Extension: 44 Rotation: Left: 71, Right: 66, Side bending: Left: 42, Right 41 6/21/21   Current: NA                     4. Pt will report worst pain as <3/10 for improved activity tolerance and ability to complete work duties               CLAIRE at [facility]: worst pain 9/10   PN 5/19/2021 : Pt reports 5/10 at worst 6/21/21  Current : 4/10 at worst in past week 7/6/21    PLAN  [x]  Upgrade activities as tolerated     [x]  Continue plan of care  []  Update interventions per flow sheet       []  Discharge due to:_  []  Other:_      Antoine Quiñonez PTA 7/13/2021  2:24 PM    Future Appointments   Date Time Provider Kelly Lozada   7/20/2021  2:00 PM [facility]   7/27/2021  2:00 PM [facility]   11/11/2021  2:10 PM ANDREA Ramirez AMB

## 2021-07-20 ENCOUNTER — APPOINTMENT (OUTPATIENT)
Dept: PHYSICAL THERAPY | Age: 51
End: 2021-07-20
Payer: COMMERCIAL

## 2021-07-27 ENCOUNTER — HOSPITAL ENCOUNTER (OUTPATIENT)
Dept: PHYSICAL THERAPY | Age: 51
Discharge: HOME | End: 2021-07-27
Payer: COMMERCIAL

## 2021-07-27 PROCEDURE — 97112 NEUROMUSCULAR REEDUCATION: CPT

## 2021-07-27 PROCEDURE — 97110 THERAPEUTIC EXERCISES: CPT

## 2021-07-27 NOTE — PROGRESS NOTES
PT DAILY TREATMENT NOTE    Patient Name: Mary Lou Cardenas  Date:2021  : 1970  [x]  Patient  Verified  Payor: Boaz Garcia / Plan: Subhashjsryan 97 / Product Type: LEVI /    In time:2:00  Out time:2:45  Total Treatment Time (min): 45  Total Timed Codes (min): 45  1:1 Treatment Time ([facility] only): 45   Visit #: 11 of 17    Treatment Dx: Neck pain [M54.2]    SUBJECTIVE  Pain Level (0-10 scale): 3/10  Any medication changes, allergies to medications, adverse drug reactions, diagnosis change, or new procedure performed?: [x] No    [] Yes (see summary sheet for update)  Subjective functional status/changes:   [] No changes reported  \"I have pain in my neck still. \"    OBJECTIVE      35 min Therapeutic Exercise:  [x] See flow sheet :   Rationale: increase ROM, increase strength and improve coordination to improve the patients ability to return to prior level of physical activity. 10 min Neuromuscular Re-education:  [x]  See flow sheet :   Rationale: increase ROM, increase strength and improve coordination  to improve the patients ability to return to prior level of physical activity. With   [] TE   [] TA   [] neuro   [] other: Patient Education: [x] Review HEP    [] Progressed/Changed HEP based on:   [] positioning   [] body mechanics   [] transfers   [] heat/ice application    [] other:      Other Objective/Functional Measures: Access Code: [email or web address]  URL: [email or web address]  Date: 2021  Prepared by: Khang Lebron    Exercises  Seated Upper Trapezius Stretch - 1 x daily - 3-5 x weekly - 2 sets - 10 reps  Seated Levator Scapulae Stretch - 1 x daily - 3-5 x weekly - 2 sets - 10 reps  Standing Bilateral Low Shoulder Row with Anchored Resistance - 1 x daily - 3-5 x weekly - 2 sets - 10 reps  Standing Shoulder Extension with Resistance - 1 x daily - 3-5 x weekly - 2 sets - 10 reps  Standing Shoulder External Rotation with Resistance - 1 x daily - 3-5 x weekly - 2 sets - 10 reps  Prone Shoulder Flexion - 1 x daily - 3-5 x weekly - 2 sets - 10 reps  Prone Shoulder Horizontal Abduction with Thumbs Up - 1 x daily - 3-5 x weekly - 2 sets - 10 reps  Prone Scapular Slide with Shoulder Extension - 1 x daily - 3-5 x weekly - 3 sets - 10 reps  Standing Shoulder Horizontal Abduction with Resistance - 1 x daily - 35 x weekly - 3 sets - 10 reps      Pain Level (0-10 scale) post treatment: 2/10    ASSESSMENT/Changes in Function: Pt reports having performed mostly stretching over two week break due to resistance band breaking. Pt reports overall improvement since beginning therapy. Pt reports they have a good understanding of therex at this time and still wish to continue the the plan to D/C to HEP at this time. Pt was given updated HEP for performance at home. Pt reports they will return with another script if the pain returns to original levels. Patient will continue to benefit from skilled PT services to modify and progress therapeutic interventions, address functional mobility deficits, address ROM deficits, address strength deficits, analyze and address soft tissue restrictions, analyze and cue movement patterns, analyze and modify body mechanics/ergonomics and assess and modify postural abnormalities to attain remaining goals. [x]  See Plan of Care  []  See progress note/recertification  []  See Discharge Summary         Progress towards goals / Updated goals:  Short Term Goals: To be accomplished in 1-2 weeks:               1.  Pt will be independent and compliant with HEP for carryover of strength and mobility gains                   Status at [facility]: Issued at               Last PN: met, patient reports HEP compliance (5/7/21)   Current: Pt reports only stretching due to thera-band breaking at home 7/27/21                  2. Pt will report >25% improvement in overall neck symptoms for ease of completing ADL's               UFKRIG at [facility]: constant symptoms  PN 5/19/2021 : pt reports 50% better since start of therapy , MET      Long Term Goals: To be accomplished in 4-6 weeks:               1.  Pt will score >67/100 on FOTO to show significant improvement in functional mobility and QOL                Status at Eval: 52/100  PN 5/19/21 6/1/2021 61/100 ( 9 point increase )    Current : 73 7/13/21 Met                  2. Pt will report >75% improvement in overall neck symptoms for ease of completing ADL's               PXMMQH at Eval: constant symptoms  PN: Pt reports 75% improvement 6/21/21  Current: 80% improvements since initial visit 7/27/21                  3. Pt will demo cervical rotation AROM >65 on L for improved ability to check blind spots   Status at Eval: Cervical rotation L 52, R 70  PN: CROM: Flexion: 52, Extension: 44 Rotation: Left: 71, Right: 66, Side bending: Left: 42, Right 41 6/21/21   Current:  CROM: Flex: 52 deg, Ext: 58 deg, Rotation: Left: 69 deg Right: 62 deg, Side bending: Left: 36 deg, Right: 35 deg. 7/27/21                     4. Pt will report worst pain as <3/10 for improved activity tolerance and ability to complete work duties               JIWXHI at [facility]: worst pain 9/10   PN 5/19/2021 : Pt reports 5/10 at worst 6/21/21  Current : 5/10 at worst in past week 7/27/21    PLAN  [x]  Upgrade activities as tolerated     [x]  Continue plan of care  []  Update interventions per flow sheet       []  Discharge due to:_  []  Other:_      Ruslan Alba PTA 7/27/2021  2:09 PM    Future Appointments   Date Time Provider Kelly Lozada   11/11/2021  2:10 PM Onnie Seats, NP JERMAINE BS AMB

## 2021-07-27 NOTE — PROGRESS NOTES
[facility]  [address] [facility]  [facility], [address]  Ph [phone]  Fx [phone]    Physical Therapy Progress Note  Patient name: Cherelle Rodarte Start of Care: 2021   Referral source: Herlinda Mendez MD : 1970   Medical/Treatment Diagnosis: Neck pain [M54.2] Onset Date:2021   Prior Hospitalization: see medical history Provider#: [id number]   Medications: Verified on Patient Summary List    Comorbidities: asthma, Hepatitis B  Prior Level of Function:Independent with ADL's, works at a restaurant as a cook    Visits from [place] of Care: 11    Missed Visits: 0    Goals/Measure of Progress:    Short Term Goals: To be accomplished in [address] [place] [address]:               1. Pt will be independent and compliant with HEP for carryover of strength and mobility gains                   Status at Eval: Issued at IE              Last PN: met, patient reports HEP compliance (21)              Current: Pt reports only stretching due to thera-band breaking at home 21                  2. Pt will report >25% improvement in overall neck symptoms for ease of completing ADL's               XTTDCN at Eval: constant symptoms  PN 2021 : pt reports 50% better since start of therapy , MET      Long Term Goals: To be accomplished in 4-6 weeks:               1.  Pt will score >67/100 on FOTO to show significant improvement in functional mobility and Adine Holly               VKMLJU at Eval: 52/100  PN 21 61/100 ( 9 point increase )    Current : 73 21 Met                  2. Pt will report >75% improvement in overall neck symptoms for ease of completing ADL's               JVZONK at Eval: constant symptoms  PN: Pt reports 75% improvement 21  Current: 80% improvements since initial visit 21                  3. Pt will demo cervical rotation AROM >65 on L for improved ability to check blind spots   Status at Eval: Cervical rotation L 52, R 70  PN: CROM: Flexion: 52, Extension: 44 Rotation: Left: 71, Right: 66, Side bending: Left: 42, Right 41 6/21/21   Current:  CROM: Flex: 52 deg, Ext: 58 deg, Rotation: Left: 69 deg Right: 62 deg, Side bending: Left: 36 deg, Right: 35 deg. 7/27/21                     4. Pt will report worst pain as <3/10 for improved activity tolerance and ability to complete work duties               EROLI at [address]: worst pain 9/10   PN 5/19/2021 : Pt reports 5/10 at worst 6/21/21  Current : 5/10 at worst in past week 7/27/21    Key Functional Changes: Pt reports having performed mostly stretching over two week break due to resistance band breaking. Pt reports overall improvement since beginning therapy. Pt reports they have a good understanding of therex at this time and still wish to continue the the plan to D/C to HEP at this time. Pt was given updated HEP for performance at home. Pt reports they will return with another script if the pain returns to original levels. Updated Goals:  To be achieved in 4 weeks:        ASSESSMENT/RECOMMENDATIONS:  []Continue therapy per initial plan/protocol at a frequency of  NA x per week for NA weeks  []Continue therapy with the following recommended changes:_____________________ _____________________________ ________________________________________  [x]Discontinue therapy progressing towards or have reached established goals  []Discontinue therapy due to lack of appreciable progress towards goals  []Discontinue therapy due to lack of attendance or compliance  []Await Physician's recommendations/decisions regarding therapy  []Other:________________________________________________________________    Thank you for this referral.   Bharath Felix, PTA 7/27/2021 3:46 PM

## 2021-08-30 NOTE — PROGRESS NOTES
[facility]  [address], [address]  Ph [phone]  Fx [phone]    Physical Therapy Discharge Summary  Patient name: Karen Adam Start of Care: 2021   Referral source: Hugo Barnes MD : 1970   Medical/Treatment Diagnosis: Neck pain [M54.2] Onset Date:2021   Prior Hospitalization: see medical history Provider#: [id number]   Medications: Verified on Patient Summary List     Comorbidities: asthma, Hepatitis B  Prior Level of Function:Independent with ADL's, works at a restaurant as a cook     Visits from [place] of Care: 11    Missed Visits: 0    Reporting Period : 21 to 21    Goals/Measure of Progress:  Short Term Goals: To be accomplished in 1-2 weeks:               1. Pt will be independent and compliant with HEP for carryover of strength and mobility gains                   Status at Eval: Issued at               Last PN: met, patient reports HEP compliance (21)              Current: Pt reports only stretching due to thera-band breaking at home 21                  2. Pt will report >25% improvement in overall neck symptoms for ease of completing ADL's               QWDIAF at Eval: constant symptoms  PN 2021 : pt reports 50% better since start of therapy , MET      Long Term Goals: To be accomplished in 4-6 weeks:               1.  Pt will score >67/100 on FOTO to show significant improvement in functional mobility and Tod Black               JBTBSG at Eval: 52/100  PN 21 61/100 ( 9 point increase )    Current : 73 21 Met                  2. Pt will report >75% improvement in overall neck symptoms for ease of completing ADL's               YHLTPL at Eval: constant symptoms  PN: Pt reports 75% improvement 21  Current: 80% improvements since initial visit 21                  3. Pt will demo cervical rotation AROM >65 on L for improved ability to check blind spots   Status at Eval: Cervical rotation L 52, R 70  PN: CROM: Flexion: 52, Extension: 44 Rotation: Left: 71, Right: 66, Side bending: Left: 42, Right 41 6/21/21   Current:  CROM: Flex: 52 deg, Ext: 58 deg, Rotation: Left: 69 deg Right: 62 deg, Side bending: Left: 36 deg, Right: 35 deg. 7/27/21                     4. Pt will report worst pain as <3/10 for improved activity tolerance and ability to complete work duties               FKQQNY at [employer]: worst pain 9/10   PN 5/19/2021 : Pt reports 5/10 at worst 6/21/21  Current : 5/10 at worst in past week 7/27/21       Assessment/ Summary of Care: Pt reports having performed mostly stretching over two week break due to resistance band breaking. Pt reports overall improvement since beginning therapy. Pt reports they have a good understanding of therex at this time and still wish to continue the the plan to D/C to HEP at this time. Pt was given updated HEP for performance at home.  Pt reports they will return with another script if the pain returns to original levels.     RECOMMENDATIONS:  []Discontinue therapy: [x]Patient has reached or is progressing toward set goals      []Patient is non-compliant or has abdicated      []Due to lack of appreciable progress towards set goals    Reva Bain, PT 8/30/2021 3:18 PM

## 2021-11-01 ENCOUNTER — HOSPITAL ENCOUNTER (OUTPATIENT)
Dept: LAB | Age: 51
Discharge: HOME | End: 2021-11-01

## 2021-11-01 LAB — XX-LABCORP SPECIMEN COL,LCBCF: NORMAL

## 2021-11-01 PROCEDURE — 99001 SPECIMEN HANDLING PT-LAB: CPT

## 2021-11-02 RX ORDER — TENOFOVIR ALAFENAMIDE 25 MG/1
TABLET ORAL
Qty: 90 TABLET | Refills: 3 | Status: SHIPPED | OUTPATIENT
Start: 2021-11-02 | End: 2022-09-25

## 2021-11-03 LAB
AFP L3 MFR SERPL: NORMAL % (ref 0–9.9)
AFP SERPL-MCNC: 1.9 NG/ML (ref 0–8)
ALBUMIN SERPL-MCNC: 4.4 G/DL (ref 3.8–4.8)
ALP SERPL-CCNC: 54 IU/L (ref 44–121)
ALT SERPL-CCNC: 8 IU/L (ref 0–32)
AST SERPL-CCNC: 13 IU/L (ref 0–40)
BASOPHILS # BLD AUTO: 0 X10E3/UL (ref 0–0.2)
BASOPHILS NFR BLD AUTO: 0 %
BILIRUB DIRECT SERPL-MCNC: <0.1 MG/DL (ref 0–0.4)
BILIRUB SERPL-MCNC: 0.2 MG/DL (ref 0–1.2)
BUN SERPL-MCNC: 12 MG/DL (ref 6–24)
BUN/CREAT SERPL: 21 (ref 9–23)
CALCIUM SERPL-MCNC: 9.4 MG/DL (ref 8.7–10.2)
CHLORIDE SERPL-SCNC: 101 MMOL/L (ref 96–106)
CO2 SERPL-SCNC: 27 MMOL/L (ref 20–29)
CREAT SERPL-MCNC: 0.57 MG/DL (ref 0.57–1)
EOSINOPHIL # BLD AUTO: 0.3 X10E3/UL (ref 0–0.4)
EOSINOPHIL NFR BLD AUTO: 4 %
ERYTHROCYTE [DISTWIDTH] IN BLOOD BY AUTOMATED COUNT: 12.3 % (ref 11.7–15.4)
GLUCOSE SERPL-MCNC: 86 MG/DL (ref 65–99)
HBV DNA SERPL NAA+PROBE-ACNC: NORMAL IU/ML
HBV DNA SERPL NAA+PROBE-LOG IU: NORMAL LOG10 IU/ML
HCT VFR BLD AUTO: 40.8 % (ref 34–46.6)
HGB BLD-MCNC: 13.6 G/DL (ref 11.1–15.9)
IMM GRANULOCYTES # BLD AUTO: 0 X10E3/UL (ref 0–0.1)
IMM GRANULOCYTES NFR BLD AUTO: 0 %
LYMPHOCYTES # BLD AUTO: 1.8 X10E3/UL (ref 0.7–3.1)
LYMPHOCYTES NFR BLD AUTO: 22 %
MCH RBC QN AUTO: 31.7 PG (ref 26.6–33)
MCHC RBC AUTO-ENTMCNC: 33.3 G/DL (ref 31.5–35.7)
MCV RBC AUTO: 95 FL (ref 79–97)
MONOCYTES # BLD AUTO: 0.5 X10E3/UL (ref 0.1–0.9)
MONOCYTES NFR BLD AUTO: 6 %
NEUTROPHILS # BLD AUTO: 5.6 X10E3/UL (ref 1.4–7)
NEUTROPHILS NFR BLD AUTO: 68 %
PLATELET # BLD AUTO: 234 X10E3/UL (ref 150–450)
POTASSIUM SERPL-SCNC: 4.2 MMOL/L (ref 3.5–5.2)
PROT SERPL-MCNC: 6.9 G/DL (ref 6–8.5)
RBC # BLD AUTO: 4.29 X10E6/UL (ref 3.77–5.28)
REF LAB TEST REF RANGE: NORMAL
SODIUM SERPL-SCNC: 141 MMOL/L (ref 134–144)
WBC # BLD AUTO: 8.3 X10E3/UL (ref 3.4–10.8)

## 2021-11-06 ENCOUNTER — HOSPITAL ENCOUNTER (OUTPATIENT)
Dept: ULTRASOUND IMAGING | Age: 51
Discharge: HOME | End: 2021-11-06
Attending: NURSE PRACTITIONER
Payer: COMMERCIAL

## 2021-11-06 DIAGNOSIS — B18.1 CHRONIC HEPATITIS B (HCC): ICD-10-CM

## 2021-11-06 PROCEDURE — 76705 ECHO EXAM OF ABDOMEN: CPT

## 2021-11-11 ENCOUNTER — OFFICE VISIT (OUTPATIENT)
Dept: HEMATOLOGY | Age: 51
End: 2021-11-11
Payer: COMMERCIAL

## 2021-11-11 VITALS
WEIGHT: 112 LBS | HEART RATE: 72 BPM | BODY MASS INDEX: 19.22 KG/M2 | DIASTOLIC BLOOD PRESSURE: 78 MMHG | TEMPERATURE: 97.1 F | OXYGEN SATURATION: 98 % | SYSTOLIC BLOOD PRESSURE: 126 MMHG

## 2021-11-11 DIAGNOSIS — B18.1 CHRONIC HEPATITIS B (HCC): Primary | ICD-10-CM

## 2021-11-11 PROCEDURE — 99213 OFFICE O/P EST LOW 20 MIN: CPT | Performed by: NURSE PRACTITIONER

## 2021-11-11 PROCEDURE — 91200 LIVER ELASTOGRAPHY: CPT | Performed by: NURSE PRACTITIONER

## 2022-03-19 PROBLEM — B18.1 CHRONIC HEPATITIS B (HCC): Status: ACTIVE | Noted: 2018-05-23

## 2022-08-01 ENCOUNTER — HOSPITAL ENCOUNTER (OUTPATIENT)
Dept: ULTRASOUND IMAGING | Age: 52
Discharge: HOME | End: 2022-08-01
Attending: NURSE PRACTITIONER
Payer: COMMERCIAL

## 2022-08-01 DIAGNOSIS — B18.1 CHRONIC HEPATITIS B (HCC): ICD-10-CM

## 2022-08-01 PROCEDURE — 76705 ECHO EXAM OF ABDOMEN: CPT

## 2022-08-02 ENCOUNTER — OFFICE VISIT (OUTPATIENT)
Dept: HEMATOLOGY | Age: 52
End: 2022-08-02
Payer: COMMERCIAL

## 2022-08-02 ENCOUNTER — HOSPITAL ENCOUNTER (OUTPATIENT)
Dept: LAB | Age: 52
Discharge: HOME | End: 2022-08-02

## 2022-08-02 VITALS
SYSTOLIC BLOOD PRESSURE: 113 MMHG | OXYGEN SATURATION: 99 % | TEMPERATURE: 97.8 F | HEART RATE: 64 BPM | HEIGHT: 64 IN | RESPIRATION RATE: 14 BRPM | WEIGHT: 113 LBS | BODY MASS INDEX: 19.29 KG/M2 | DIASTOLIC BLOOD PRESSURE: 75 MMHG

## 2022-08-02 DIAGNOSIS — B18.1 CHRONIC HEPATITIS B (HCC): Primary | ICD-10-CM

## 2022-08-02 LAB — SENTARA SPECIMEN COL,SENBCF: NORMAL

## 2022-08-02 PROCEDURE — 99001 SPECIMEN HANDLING PT-LAB: CPT

## 2022-08-02 PROCEDURE — 99213 OFFICE O/P EST LOW 20 MIN: CPT | Performed by: NURSE PRACTITIONER

## 2022-08-02 NOTE — PROGRESS NOTES
[address] [facility], MD, FACP, [address], [place]      TORRIE Butler, [facility]-BC     Sue HSU Lo, [facility]   Izora Hashimoto, P-C    Maurizio Morales, [facility]       Walejuan Caban [address]    at [place] [address], [address] [address]  22.    [phone]    FAX: [address] [place] [address] Avenue    [address], [address]    [phone]    FAX: [phone]     Patient Care Team:  Juan Hernandez MD as PCP - General (Family Medicine)  Luis Bowman NP. Fax [phone]      Problem List  Date Reviewed: 11/15/2021            Codes Class Noted    Chronic hepatitis B [facility]) ICD-10-CM: B18.1  ICD-9-CM: 070.32  5/23/2018           Mary Price returns to the [address] for management of chronic HBV. The active problem list, all pertinent past medical history, medications, radiologic findings and laboratory findings related to the liver disorder were reviewed with the patient. The patient is a 47 y.o. female who tested positive for HBV in the 1990s before she immigrated to the [place] from [facility] in 2005. Assessment of liver fibrosis with Fibroscan was performed in 11/2021. The result was 4.5 kPa which correlates with no fibrosis. The CAP score of 225 suggests there is no hepatic steatosis. She has E-antigen positive active HBV   She has been on Vemlidy since 6/2018. The patient has no symptoms which can be attributed to the liver disorder. The patient has not experienced fatigue, pain in the right side over the liver. The patient completes all daily activities without any functional limitations.       ASSESSMENT AND PLAN:  Chronic HBV  Chronic HBV that is E-antigen positive active   The degree of liver injury was assessed by Fibroscan in 11/2021. This was consistent with stage 1 fibrosis. Liver transaminases are normal.  ALP is normal.  Liver function is normal.  The platelet count is normal.    HBV DNA is 13 IU/ml. The patient is receiving treatment with tenofovir-AF Sarah Dawkins). The patientis responding to and tolerating treatment without significant side effects. Will perform laboratory testing to monitor liver function and degree of liver injury. This included BMP, hepatic panel, CBC with platelet count    Will repeat HBV DNA    The need to perform an assessment of liver fibrosis was discussed with the patient. The Fibroscan can assess liver fibrosis and determine if a patient has advanced fibrosis or cirrhosis without the need for liver biopsy. The Fibroscan can be repeated annually or as often as clinically indicated to assess for fibrosis progression and/or regression. Screening for Hepatocellular Carcinoma  HCC screening in patients with HBV should be performed with ultrasound on an annual basis in patients with inactive HBV who are under the age of 48 years. Over the age of 48 years [address] screening with ultrasound should be performed every 6 months. [address] screening has recently been performed and does not suggest [address] The next liver imaging study will be performed in 2/2023. AFP was ordered today. Treatment of other medical problems in patients with chronic liver disease  The patient was directed to continue all current medications at the current dosages. There are no contraindications for the patient to take any medications that are necessary for treatment of other medical issues. Counseling for alcohol in patients with chronic liver disease  The patient was counseled regarding alcohol consumption and the effect of alcohol on chronic liver disease. The patient does not consume any significant amount of alcohol. Vaccinations   Vaccination for viral hepatitis A is not needed.   The patient has serologic evidence of prior exposure or vaccination with immunity. Routine vaccinations against other bacterial and viral agents can be performed as indicated. Annual flu vaccination should be administered if indicated. ALLERGIES  Allergies   Allergen Reactions    Aleve [Naproxen Sodium] Other (comments)    Tylenol [Acetaminophen] Other (comments)       MEDICATIONS  Current Outpatient Medications   Medication Sig    Vemlidy 25 mg tablet TAKE ONE TABLET BY MOUTH ONE TIME DAILY    mometasone (NASONEX) 50 mcg/actuation nasal spray 2 Sprays daily. fluticasone propion-salmeteroL (ADVAIR/WIXELA) 100-50 mcg/dose diskus inhaler Take 1 Puff by inhalation every twelve (12) hours. No current facility-administered medications for this visit. SYSTEM REVIEW NOT RELATED TO LIVER DISEASE OR REVIEWED ABOVE:  Constitution systems: Negative for fever, chills, weight gain, weight loss. Eyes: Negative for visual changes. ENT: Negative for sore throat, painful swallowing. Respiratory: Negative for cough, hemoptysis, SOB. Cardiology: Negative for chest pain, palpitations. GI:  Negative for constipation or diarrhea. : Negative for urinary frequency, dysuria, hematuria, nocturia. Skin: Negative for rash. Hematology: Negative for easy bruising, blood clots. Musculo-skelatal: Negative for back pain, muscle pain, weakness. Neurologic: Negative for headaches, dizziness, vertigo, memory problems not related to HE. Psychology: Negative for anxiety, depression. FAMILY HISTORY:  The father is alive and healthy. The mother has the following chronic diseases: HBV. SOCIAL HISTORY:  The patient is . The spouse has been checked for HBV negative. The patient has 2 children. The children have been vaccinated for HBV. The patient has never used tobacco products. The patient has never consumed significant amounts of alcohol.     The patient currently works full time in a restaurant. PHYSICAL EXAMINATION:  Visit Vitals  /75 (BP 1 Location: Left upper arm, BP Patient Position: Sitting)   Pulse 64   Temp 97.8 °F (36.6 °C) (Temporal)   Resp 14   Ht 5' 4\" (1.626 m)   Wt 113 lb (51.3 kg)   SpO2 99%   BMI 19.40 kg/m²       General: No acute distress. Eyes: Sclera anicteric. ENT: No oral lesions. Thyroid normal.  Nodes: No adenopathy. Skin: No spider angiomata. No jaundice. No palmar erythema. Respiratory: Lungs clear to auscultation. Cardiovascular: Regular heart rate. No murmurs. No JVD. Abdomen: Soft non-tender. Liver size normal to percussion/palpation. Spleen not palpable. No obvious ascites. Extremities: No edema. No muscle wasting. No gross arthritic changes. Neurologic: Alert and oriented. Cranial nerves grossly intact. No asterixis. LABORATORY STUDIES:  Liver [place] [address] Units 11/1/2021   WBC 3.4 - 10.8 x10E3/uL 8.3   ANC 1.4 - 7.0 x10E3/uL 5.6   HGB 11.1 - 15.9 g/dL 13.6    - 450 x10E3/uL 234   AST 0 - 40 IU/L 13   ALT 0 - 32 IU/L 8   Alk Phos 44 - 121 IU/L 54   Bili, Total 0.0 - 1.2 mg/dL 0.2   Bili, Direct 0.00 - 0.40 mg/dL <0.10   Albumin 3.8 - 4.8 g/dL 4.4   BUN 6 - 24 mg/dL 12   Creat 0.57 - 1.00 mg/dL 0.57   Na 134 - 144 mmol/L 141   K 3.5 - 5.2 mmol/L 4.2   Cl 96 - 106 mmol/L 101   CO2 20 - 29 mmol/L 27   Glucose 65 - 99 mg/dL 86     SEROLOGIES:  Serologies Latest Ref Rng & Units 5/23/2018   Hep A Ab, Total Negative Positive (A)   Hep B Surface Ag None Detec Detected (A)     5/2018. HBEantigen positive, anti-HBE negative, HBV ,000 intU, anti-HCV negative    HBV DNA   5/2019. 13.2  IU/mL  11/2020. < 10  IU/mL  11/2021. Not detected    LIVER HISTOLOGY:  5/2021. Shear wave elastography performed at THE [facility]. EkPa was E Range: 3.5-5.6 kPa, E Mean: 4.2 kPa  E Median: 4.0 kPa, E Std: 0.6 kPa. The results suggested a fibrosis level of F0.    11/2021. FibroScan performed at [address].  EkPa was 4.5. IQR/med 57%. . The results suggested a fibrosis level of F0. The high IQR% suggests that the measurement may not be reliable. The CAP score suggests there is no significant hepatic steatosis. ENDOSCOPIC PROCEDURES:  Not available or performed    RADIOLOGY:  11/2020. Ultrasound of liver. Echogenic consistent with chronic liver disease. No liver mass lesions. No dilated bile ducts. No ascites. 5/2021. Ultrasound of liver. Echogenic consistent with chronic liver disease. No liver mass lesions. No dilated bile ducts. No ascites. 11/2021. Ultrasound of liver. Echogenic consistent with chronic liver disease. No liver mass lesions. No dilated bile ducts. No ascites. 8/2022. Ultrasound of liver. Echogenic consistent with chronic liver disease. No liver mass lesions. No dilated bile ducts. No ascites. OTHER TESTING:  Not available or performed      FOLLOW-UP:  All of the issues listed above in the Assessment and Plan were discussed with the patient. All questions were answered. The patient expressed a clear understanding of the above.     Follow-up Ismael Villegas 32 in 6 months for routine monitoring and LACHELLE Cason-BC  [address] [place] [address]  [place], [address] [place] [address] - [address]  [phone]

## 2022-08-08 LAB
AFP L3 MFR SERPL: NORMAL % (ref 0–9.9)
AFP SERPL-MCNC: 2.4 NG/ML (ref 0–9.2)
ALBUMIN SERPL-MCNC: 5 G/DL (ref 3.8–4.9)
ALP SERPL-CCNC: 59 IU/L (ref 44–121)
ALT SERPL-CCNC: 8 IU/L (ref 0–32)
AST SERPL-CCNC: 13 IU/L (ref 0–40)
BASOPHILS # BLD AUTO: 0 X10E3/UL (ref 0–0.2)
BASOPHILS NFR BLD AUTO: 0 %
BILIRUB DIRECT SERPL-MCNC: <0.1 MG/DL (ref 0–0.4)
BILIRUB SERPL-MCNC: 0.3 MG/DL (ref 0–1.2)
BUN SERPL-MCNC: 12 MG/DL (ref 6–24)
BUN/CREAT SERPL: 23 (ref 9–23)
CALCIUM SERPL-MCNC: 9.5 MG/DL (ref 8.7–10.2)
CHLORIDE SERPL-SCNC: 102 MMOL/L (ref 96–106)
CO2 SERPL-SCNC: 29 MMOL/L (ref 20–29)
CREAT SERPL-MCNC: 0.52 MG/DL (ref 0.57–1)
EGFR: 112 ML/MIN/1.73
EOSINOPHIL # BLD AUTO: 0.3 X10E3/UL (ref 0–0.4)
EOSINOPHIL NFR BLD AUTO: 6 %
ERYTHROCYTE [DISTWIDTH] IN BLOOD BY AUTOMATED COUNT: 11.7 % (ref 11.7–15.4)
GLUCOSE SERPL-MCNC: 74 MG/DL (ref 65–99)
HCT VFR BLD AUTO: 42.1 % (ref 34–46.6)
HGB BLD-MCNC: 14 G/DL (ref 11.1–15.9)
IMM GRANULOCYTES # BLD AUTO: 0 X10E3/UL (ref 0–0.1)
IMM GRANULOCYTES NFR BLD AUTO: 0 %
LYMPHOCYTES # BLD AUTO: 1.7 X10E3/UL (ref 0.7–3.1)
LYMPHOCYTES NFR BLD AUTO: 29 %
MCH RBC QN AUTO: 31.2 PG (ref 26.6–33)
MCHC RBC AUTO-ENTMCNC: 33.3 G/DL (ref 31.5–35.7)
MCV RBC AUTO: 94 FL (ref 79–97)
MONOCYTES # BLD AUTO: 0.4 X10E3/UL (ref 0.1–0.9)
MONOCYTES NFR BLD AUTO: 7 %
NEUTROPHILS # BLD AUTO: 3.3 X10E3/UL (ref 1.4–7)
NEUTROPHILS NFR BLD AUTO: 58 %
PLATELET # BLD AUTO: 217 X10E3/UL (ref 150–450)
POTASSIUM SERPL-SCNC: 4.2 MMOL/L (ref 3.5–5.2)
PROT SERPL-MCNC: 7.1 G/DL (ref 6–8.5)
RBC # BLD AUTO: 4.49 X10E6/UL (ref 3.77–5.28)
SODIUM SERPL-SCNC: 142 MMOL/L (ref 134–144)
SPECIMEN STATUS REPORT, ROLRST: NORMAL
WBC # BLD AUTO: 5.7 X10E3/UL (ref 3.4–10.8)

## 2022-08-09 NOTE — PROGRESS NOTES
Labs reviewed. All labs were either normal and/or abnormal values were not clinically meaningful to patient's current visit. HBV DNA not detected.

## 2022-09-25 RX ORDER — TENOFOVIR ALAFENAMIDE 25 MG/1
TABLET ORAL
Qty: 90 TABLET | Refills: 3 | Status: SHIPPED | OUTPATIENT
Start: 2022-09-25

## 2023-01-26 RX ORDER — TENOFOVIR ALAFENAMIDE 25 MG/1
25 TABLET ORAL DAILY
Qty: 90 TABLET | Refills: 3 | Status: SHIPPED | OUTPATIENT
Start: 2023-01-26

## 2023-02-11 ENCOUNTER — HOSPITAL ENCOUNTER (OUTPATIENT)
Facility: HOSPITAL | Age: 53
End: 2023-02-11
Payer: COMMERCIAL

## 2023-02-11 DIAGNOSIS — B18.1 CHRONIC HEPATITIS B (HCC): ICD-10-CM

## 2023-02-11 PROCEDURE — 93976 VASCULAR STUDY: CPT

## 2023-03-02 ENCOUNTER — TELEPHONE (OUTPATIENT)
Age: 53
End: 2023-03-02

## 2023-03-02 NOTE — TELEPHONE ENCOUNTER
----- Message from CANDELARIA Patel NP sent at 2/13/2023 11:19 AM EST -----  Please call patient and let them know that their recent ultrasound is stable with no concerning lesions/masses within the liver.

## 2023-03-16 ENCOUNTER — OFFICE VISIT (OUTPATIENT)
Age: 53
End: 2023-03-16
Payer: COMMERCIAL

## 2023-03-16 ENCOUNTER — HOSPITAL ENCOUNTER (OUTPATIENT)
Facility: HOSPITAL | Age: 53
Setting detail: SPECIMEN
Discharge: HOME | End: 2023-03-19

## 2023-03-16 VITALS
HEART RATE: 80 BPM | SYSTOLIC BLOOD PRESSURE: 103 MMHG | DIASTOLIC BLOOD PRESSURE: 66 MMHG | WEIGHT: 125 LBS | BODY MASS INDEX: 21.34 KG/M2 | TEMPERATURE: 97.3 F | OXYGEN SATURATION: 97 % | HEIGHT: 64 IN

## 2023-03-16 DIAGNOSIS — B18.1 CHRONIC HEPATITIS B (HCC): Primary | ICD-10-CM

## 2023-03-16 DIAGNOSIS — B18.1 CHRONIC HEPATITIS B (HCC): ICD-10-CM

## 2023-03-16 LAB — SENTARA SPECIMEN COLLECTION: NORMAL

## 2023-03-16 PROCEDURE — 99000 SPECIMEN HANDLING OFFICE-LAB: CPT

## 2023-03-16 PROCEDURE — 99001 SPECIMEN HANDLING PT-LAB: CPT

## 2023-03-16 PROCEDURE — 99213 OFFICE O/P EST LOW 20 MIN: CPT | Performed by: NURSE PRACTITIONER

## 2023-03-16 PROCEDURE — 91200 LIVER ELASTOGRAPHY: CPT | Performed by: NURSE PRACTITIONER

## 2023-03-16 ASSESSMENT — PATIENT HEALTH QUESTIONNAIRE - PHQ9
SUM OF ALL RESPONSES TO PHQ QUESTIONS 1-9: 0
SUM OF ALL RESPONSES TO PHQ QUESTIONS 1-9: 0
1. LITTLE INTEREST OR PLEASURE IN DOING THINGS: 0
SUM OF ALL RESPONSES TO PHQ QUESTIONS 1-9: 0
SUM OF ALL RESPONSES TO PHQ QUESTIONS 1-9: 0
2. FEELING DOWN, DEPRESSED OR HOPELESS: 0
SUM OF ALL RESPONSES TO PHQ9 QUESTIONS 1 & 2: 0

## 2023-03-16 NOTE — PROGRESS NOTES
[address] [facility], MD, FACP, [place] Abigail Price, [place]      SINDI Hamlin, [facility]     Sujatha ZENDEJAS Ron, [facility]-BC   Clementina Hutchison VANNESSA-JULIÁN Graff [facility]-BC       [facility]    at [place] [address], [address] [address]  22.    [phone]    FAX: [address] [place] [address], [address]    [phone]    FAX: [phone]     Patient Care Team:  Bari Richards MD as PCP - General (Family Medicine)  Daryle Norma, NP. Fax [phone]      Problem List  Date Reviewed: 11/15/2021            Codes Class Noted    Chronic hepatitis B [facility]) ICD-10-CM: B18.1  ICD-9-CM: 070.32  5/23/2018           Katie Sharpe returns to the [address] for management of chronic HBV. The active problem list, all pertinent past medical history, medications, radiologic findings and laboratory findings related to the liver disorder were reviewed with the patient. The patient is a 47 y.o. female who tested positive for HBV in the 1990s before she immigrated to the [place] from [facility] in 2005. The most recent imaging of the liver was Ultrasound performed in 2/2023. Results suggest chronic liver disease. No liver mass lesions noted. Assessment of liver fibrosis with Fibroscan was performed in the office today. The result was 3.6 kPa which correlates with no fibrosis. The CAP score of 256 suggests hepatic steatosis. She has E-antigen positive active HBV   She has been on Vemlidy since 6/2018. The patient has no symptoms which can be attributed to the liver disorder. The patient has not experienced fatigue, pain in the right side over the liver.      The patient completes all daily activities without any

## 2023-03-17 LAB
A/G RATIO: 1.9 RATIO (ref 1.1–2.6)
ALBUMIN SERPL-MCNC: 4.9 G/DL (ref 3.5–5)
ALP BLD-CCNC: 74 U/L (ref 25–115)
ALT SERPL-CCNC: 10 U/L (ref 5–40)
ANION GAP SERPL CALCULATED.3IONS-SCNC: 13 MMOL/L (ref 3–15)
AST SERPL-CCNC: 16 U/L (ref 10–37)
BASOPHILS # BLD: 1 % (ref 0–2)
BASOPHILS ABSOLUTE: 0 K/UL (ref 0–0.2)
BILIRUB SERPL-MCNC: 0.3 MG/DL (ref 0.2–1.2)
BILIRUBIN DIRECT: <0.2 MG/DL (ref 0–0.3)
BUN BLDV-MCNC: 18 MG/DL (ref 6–22)
CALCIUM SERPL-MCNC: 10 MG/DL (ref 8.4–10.5)
CHLORIDE BLD-SCNC: 102 MMOL/L (ref 98–110)
CO2: 28 MMOL/L (ref 20–32)
CREAT SERPL-MCNC: 0.5 MG/DL (ref 0.5–1.2)
EOSINOPHIL # BLD: 5 % (ref 0–6)
EOSINOPHILS ABSOLUTE: 0.3 K/UL (ref 0–0.5)
GLOBULIN: 2.6 G/DL (ref 2–4)
GLOMERULAR FILTRATION RATE: >60 ML/MIN/1.73 SQ.M.
GLUCOSE: 81 MG/DL (ref 70–99)
HCT VFR BLD CALC: 44.7 % (ref 35.1–48)
HEMOGLOBIN: 14 G/DL (ref 11.7–16)
LYMPHOCYTES # BLD: 28 % (ref 20–45)
LYMPHOCYTES ABSOLUTE: 1.8 K/UL (ref 1–4.8)
MCH RBC QN AUTO: 31 PG (ref 26–34)
MCHC RBC AUTO-ENTMCNC: 31 G/DL (ref 31–36)
MCV RBC AUTO: 99 FL (ref 80–99)
MONOCYTES ABSOLUTE: 0.4 K/UL (ref 0.1–1)
MONOCYTES: 7 % (ref 3–12)
NEUTROPHILS ABSOLUTE: 3.9 K/UL (ref 1.8–7.7)
NEUTROPHILS: 60 % (ref 40–75)
PDW BLD-RTO: 12.5 % (ref 10–15.5)
PLATELET # BLD: 290 K/UL (ref 140–440)
PMV BLD AUTO: 10.2 FL (ref 9–13)
POTASSIUM SERPL-SCNC: 4 MMOL/L (ref 3.5–5.5)
RBC: 4.52 M/UL (ref 3.8–5.2)
SODIUM BLD-SCNC: 143 MMOL/L (ref 133–145)
TOTAL PROTEIN: 7.5 G/DL (ref 6.4–8.3)
WBC: 6.5 K/UL (ref 4–11)

## 2023-03-20 LAB
AFP WITH AFP-L3%: NORMAL % (ref 0–9.9)
AFP, SERUM: 1.8 NG/ML (ref 0–9.2)

## 2023-09-30 ENCOUNTER — HOSPITAL ENCOUNTER (OUTPATIENT)
Facility: HOSPITAL | Age: 53
Discharge: HOME | End: 2023-09-30
Payer: COMMERCIAL

## 2023-09-30 DIAGNOSIS — B18.1 CHRONIC HEPATITIS B (HCC): ICD-10-CM

## 2023-09-30 PROCEDURE — 76705 ECHO EXAM OF ABDOMEN: CPT

## 2023-10-16 ENCOUNTER — TELEMEDICINE (OUTPATIENT)
Age: 53
End: 2023-10-16

## 2023-10-16 DIAGNOSIS — B18.1 CHRONIC HEPATITIS B (HCC): Primary | ICD-10-CM

## 2023-10-16 RX ORDER — TENOFOVIR ALAFENAMIDE 25 MG/1
1 TABLET ORAL DAILY
Qty: 90 TABLET | Refills: 3 | Status: ACTIVE | OUTPATIENT
Start: 2023-10-16

## 2023-10-16 NOTE — PROGRESS NOTES
other medical problems in patients with chronic liver disease  The patient was directed to continue all current medications at the current dosages. There are no contraindications for the patient to take any medications that are necessary for treatment of other medical issues. Counseling for alcohol in patients with chronic liver disease  The patient was counseled regarding alcohol consumption and the effect of alcohol on chronic liver disease. The patient does not consume any significant amount of alcohol. Vaccinations   Vaccination for viral hepatitis A is not needed. The patient has serologic evidence of prior exposure or vaccination with immunity. Routine vaccinations against other bacterial and viral agents can be performed as indicated. Annual flu vaccination should be administered if indicated. ALLERGIES  Allergies   Allergen Reactions    Acetaminophen Other (See Comments)    Naproxen Sodium Other (See Comments)       MEDICATIONS  Current Outpatient Medications   Medication Sig Dispense Refill    fluticasone-salmeterol (ADVAIR) 100-50 MCG/ACT AEPB diskus inhaler Inhale 1 puff into the lungs every 12 hours      mometasone (NASONEX) 50 MCG/ACT nasal spray 2 sprays daily      Tenofovir Alafenamide Fumarate (VEMLIDY) 25 MG TABS TAKE ONE TABLET BY MOUTH ONE TIME DAILY       No current facility-administered medications for this visit. SYSTEM REVIEW NOT RELATED TO LIVER DISEASE OR REVIEWED ABOVE:  Constitution systems: Negative for fever, chills, weight gain, weight loss. Eyes: Negative for visual changes. ENT: Negative for sore throat, painful swallowing. Respiratory: Negative for cough, hemoptysis, SOB. Cardiology: Negative for chest pain, palpitations. GI:  Negative for constipation or diarrhea. : Negative for urinary frequency, dysuria, hematuria, nocturia. Skin: Negative for rash. Hematology: Negative for easy bruising, blood clots.     Musculo-skelatal: Negative for back

## 2024-04-20 ENCOUNTER — HOSPITAL ENCOUNTER (OUTPATIENT)
Facility: HOSPITAL | Age: 54
End: 2024-04-20
Payer: COMMERCIAL

## 2024-04-20 DIAGNOSIS — B18.1 CHRONIC HEPATITIS B (HCC): ICD-10-CM

## 2024-04-20 PROCEDURE — 76705 ECHO EXAM OF ABDOMEN: CPT

## 2024-05-07 ENCOUNTER — OFFICE VISIT (OUTPATIENT)
Age: 54
End: 2024-05-07

## 2024-05-07 ENCOUNTER — HOSPITAL ENCOUNTER (OUTPATIENT)
Facility: HOSPITAL | Age: 54
Setting detail: SPECIMEN
Discharge: HOME | End: 2024-05-10

## 2024-05-07 VITALS
HEART RATE: 78 BPM | DIASTOLIC BLOOD PRESSURE: 72 MMHG | SYSTOLIC BLOOD PRESSURE: 112 MMHG | WEIGHT: 109 LBS | BODY MASS INDEX: 18.61 KG/M2 | OXYGEN SATURATION: 99 % | HEIGHT: 64 IN | TEMPERATURE: 96.1 F

## 2024-05-07 DIAGNOSIS — B18.1 CHRONIC HEPATITIS B (HCC): Primary | ICD-10-CM

## 2024-05-07 DIAGNOSIS — B18.1 CHRONIC HEPATITIS B (HCC): ICD-10-CM

## 2024-05-07 LAB — SENTARA SPECIMEN COLLECTION: NORMAL

## 2024-05-07 PROCEDURE — 99001 SPECIMEN HANDLING PT-LAB: CPT

## 2024-05-07 RX ORDER — TENOFOVIR ALAFENAMIDE 25 MG/1
1 TABLET ORAL DAILY
Qty: 90 TABLET | Refills: 3 | Status: SHIPPED | OUTPATIENT
Start: 2024-05-07

## 2024-05-07 NOTE — PROGRESS NOTES
liver mass lesions. No dilated bile ducts. No ascites.    4/2024. Ultrasound of liver. Normal appearing liver. No liver mass lesions.    OTHER TESTING:  Not available or performed      FOLLOW-UP:  All of the issues listed above in the Assessment and Plan were discussed with the patient.  All questions were answered.  The patient expressed a clear understanding of the above.    Follow[facility]up [facility] for routine monitoring.       Kaelyn Fishman AGPCNP-BC  [facility]  [address] [place] [address], [address]  [place]  [address] [phone]

## 2024-05-11 LAB
A/G RATIO: 1.9 RATIO (ref 1.1–2.6)
AFP (ALPHA FETOPROTEIN) L3%: NORMAL % (ref 0.5–9.9)
AFP: 1.9 NG/ML (ref 1.6–4.5)
ALBUMIN: 4.7 G/DL (ref 3.5–5)
ALP BLD-CCNC: 100 U/L (ref 25–115)
ALT SERPL-CCNC: 11 U/L (ref 5–40)
ANION GAP SERPL CALCULATED.3IONS-SCNC: 12 MMOL/L (ref 3–15)
AST SERPL-CCNC: 16 U/L (ref 10–37)
BILIRUB SERPL-MCNC: 0.3 MG/DL (ref 0.2–1.2)
BILIRUBIN DIRECT: <0.2 MG/DL (ref 0–0.3)
BUN BLDV-MCNC: 13 MG/DL (ref 6–22)
CALCIUM SERPL-MCNC: 9.5 MG/DL (ref 8.4–10.5)
CHLORIDE BLD-SCNC: 103 MMOL/L (ref 98–110)
CO2: 26 MMOL/L (ref 20–32)
CREAT SERPL-MCNC: 0.6 MG/DL (ref 0.5–1.2)
GFR, ESTIMATED: >60 ML/MIN/1.73 SQ.M.
GLOBULIN: 2.5 G/DL (ref 2–4)
GLUCOSE: 77 MG/DL (ref 70–99)
POTASSIUM SERPL-SCNC: 4 MMOL/L (ref 3.5–5.5)
SODIUM BLD-SCNC: 141 MMOL/L (ref 133–145)
TOTAL PROTEIN: 7.2 G/DL (ref 6.4–8.3)

## 2024-05-13 LAB — HEPATITIS B VIRUS QUANT TND: NORMAL IU/ML

## 2024-05-21 ENCOUNTER — TELEPHONE (OUTPATIENT)
Age: 54
End: 2024-05-21

## 2024-05-21 NOTE — TELEPHONE ENCOUNTER
Patient called to say Insurance denied Vemlidy. I explained to patient that I would speak with pharmacy liaison to get it straightened out. Mrs Brewer was called and stated she would handle it.

## 2024-05-23 RX ORDER — ENTECAVIR 0.5 MG/1
0.5 TABLET, FILM COATED ORAL DAILY
Qty: 30 TABLET | Refills: 3 | Status: ACTIVE | OUTPATIENT
Start: 2024-05-23

## 2024-07-15 ENCOUNTER — HOSPITAL ENCOUNTER (OUTPATIENT)
Facility: HOSPITAL | Age: 54
Setting detail: SPECIMEN
Discharge: HOME | End: 2024-07-18

## 2024-07-15 DIAGNOSIS — B18.1 CHRONIC HEPATITIS B (HCC): Primary | ICD-10-CM

## 2024-07-15 DIAGNOSIS — B18.1 CHRONIC HEPATITIS B (HCC): ICD-10-CM

## 2024-07-15 LAB — SENTARA SPECIMEN COLLECTION: NORMAL

## 2024-07-15 PROCEDURE — 99001 SPECIMEN HANDLING PT-LAB: CPT

## 2024-07-17 LAB
A/G RATIO: 2.3 RATIO (ref 1.1–2.6)
ALBUMIN: 4.9 G/DL (ref 3.5–5)
ALP BLD-CCNC: 62 U/L (ref 25–115)
ALT SERPL-CCNC: 10 U/L (ref 5–40)
ANION GAP SERPL CALCULATED.3IONS-SCNC: 14 MMOL/L (ref 3–15)
AST SERPL-CCNC: 15 U/L (ref 10–37)
BASOPHILS ABSOLUTE: 0 K/UL (ref 0–0.2)
BASOPHILS RELATIVE PERCENT: 0 % (ref 0–2)
BILIRUB SERPL-MCNC: 0.4 MG/DL (ref 0.2–1.2)
BILIRUBIN DIRECT: <0.2 MG/DL (ref 0–0.3)
BUN BLDV-MCNC: 16 MG/DL (ref 6–22)
CALCIUM SERPL-MCNC: 9.6 MG/DL (ref 8.4–10.5)
CHLORIDE BLD-SCNC: 101 MMOL/L (ref 98–110)
CO2: 26 MMOL/L (ref 20–32)
CREAT SERPL-MCNC: 0.5 MG/DL (ref 0.5–1.2)
EOSINOPHIL # BLD: 9 % (ref 0–6)
EOSINOPHILS ABSOLUTE: 0.6 K/UL (ref 0–0.5)
GFR, ESTIMATED: >60 ML/MIN/1.73 SQ.M.
GLOBULIN: 2.1 G/DL (ref 2–4)
GLUCOSE: 74 MG/DL (ref 70–99)
HCT VFR BLD CALC: 44.9 % (ref 35.1–48)
HEMOGLOBIN: 13.9 G/DL (ref 11.7–16)
LYMPHOCYTES # BLD: 34 % (ref 20–45)
LYMPHOCYTES ABSOLUTE: 2.3 K/UL (ref 1–4.8)
MCH RBC QN AUTO: 31 PG (ref 26–34)
MCHC RBC AUTO-ENTMCNC: 31 G/DL (ref 31–36)
MCV RBC AUTO: 99 FL (ref 80–99)
MONOCYTES ABSOLUTE: 0.4 K/UL (ref 0.1–1)
MONOCYTES: 6 % (ref 3–12)
NEUTROPHILS ABSOLUTE: 3.4 K/UL (ref 1.8–7.7)
NEUTROPHILS: 51 % (ref 40–75)
PDW BLD-RTO: 12.7 % (ref 10–15.5)
PLATELET # BLD: 247 K/UL (ref 140–440)
PMV BLD AUTO: 10.4 FL (ref 9–13)
POTASSIUM SERPL-SCNC: 4.4 MMOL/L (ref 3.5–5.5)
RBC # BLD: 4.55 M/UL (ref 3.8–5.2)
SODIUM BLD-SCNC: 141 MMOL/L (ref 133–145)
TOTAL PROTEIN: 7 G/DL (ref 6.4–8.3)
WBC # BLD: 6.6 K/UL (ref 4–11)

## 2024-07-18 LAB — HEPATITIS B VIRUS QUANT TND: NORMAL IU/ML

## 2024-09-17 RX ORDER — ENTECAVIR 0.5 MG/1
0.5 TABLET, FILM COATED ORAL DAILY
Qty: 90 TABLET | Refills: 3 | Status: ACTIVE | OUTPATIENT
Start: 2024-09-17

## 2024-12-02 ENCOUNTER — TELEMEDICINE (OUTPATIENT)
Age: 54
End: 2024-12-02
Payer: COMMERCIAL

## 2024-12-02 DIAGNOSIS — B18.1 CHRONIC HEPATITIS B (HCC): ICD-10-CM

## 2024-12-02 DIAGNOSIS — B18.1 CHRONIC HEPATITIS B (HCC): Primary | ICD-10-CM

## 2024-12-02 PROCEDURE — 99214 OFFICE O/P EST MOD 30 MIN: CPT | Performed by: NURSE PRACTITIONER

## 2024-12-02 RX ORDER — TENOFOVIR ALAFENAMIDE 25 MG/1
1 TABLET ORAL DAILY
Qty: 90 TABLET | Refills: 3 | Status: ACTIVE | OUTPATIENT
Start: 2024-12-02

## 2024-12-02 RX ORDER — FLUTICASONE PROPIONATE 50 MCG
SPRAY, SUSPENSION (ML) NASAL
COMMUNITY
Start: 2024-09-26

## 2024-12-02 RX ORDER — BECLOMETHASONE DIPROPIONATE MONOHYDRATE 42 UG/1
2 SPRAY, SUSPENSION NASAL 2 TIMES DAILY
COMMUNITY
Start: 2024-01-24

## 2024-12-02 RX ORDER — ALBUTEROL SULFATE AND BUDESONIDE 90; 80 UG/1; UG/1
AEROSOL, METERED RESPIRATORY (INHALATION)
COMMUNITY
Start: 2024-11-13

## 2024-12-02 RX ORDER — MONTELUKAST SODIUM 10 MG/1
10 TABLET ORAL NIGHTLY
COMMUNITY
Start: 2024-11-13

## 2024-12-02 RX ORDER — FAMOTIDINE 20 MG/1
20 TABLET, FILM COATED ORAL NIGHTLY
COMMUNITY
Start: 2024-11-14

## 2024-12-02 RX ORDER — BUDESONIDE AND FORMOTEROL FUMARATE 80; 4.5 UG/1; UG/1
AEROSOL, METERED RESPIRATORY (INHALATION)
COMMUNITY
Start: 2024-11-15

## 2024-12-02 RX ORDER — ALBUTEROL SULFATE 90 UG/1
2 INHALANT RESPIRATORY (INHALATION) EVERY 4 HOURS PRN
COMMUNITY
Start: 2024-01-24

## 2024-12-02 RX ORDER — CEFUROXIME AXETIL 500 MG/1
TABLET ORAL
COMMUNITY
Start: 2024-09-26

## 2024-12-02 RX ORDER — ARFORMOTEROL TARTRATE 15 UG/2ML
15 SOLUTION RESPIRATORY (INHALATION) 2 TIMES DAILY
COMMUNITY
Start: 2024-01-24

## 2024-12-02 RX ORDER — ACETAMINOPHEN 325 MG/1
650 TABLET ORAL EVERY 6 HOURS PRN
COMMUNITY

## 2024-12-02 RX ORDER — NYSTATIN 100000 [USP'U]/ML
SUSPENSION ORAL
COMMUNITY

## 2024-12-02 RX ORDER — PANTOPRAZOLE SODIUM 40 MG/1
40 TABLET, DELAYED RELEASE ORAL DAILY
COMMUNITY
Start: 2024-10-29

## 2024-12-02 RX ORDER — RISEDRONATE SODIUM 150 MG/1
TABLET, FILM COATED ORAL
COMMUNITY
Start: 2024-11-30

## 2024-12-02 NOTE — PROGRESS NOTES
[facility]      Ron Harmon MD, FACP, FACG, FAASLD      Zoey Chan, PA-C    Viky Gagnon, [facility]     Sujatha Velasquez, [facility]   Kwame Sibley [facility]-    Kaelyn Fishman, [facility]    at [facility]    [address], [address]    [place]  [address] [phone]    FAX: [phone]   [facility]    [address]    [place], [place]  [id number] [phone]    FAX: [phone]     Patient Care Team:  Sulema Vincent MD as PCP - General (Family Medicine)  Yue Andrade NP.  Fax [phone]    Patient Active Problem List   Diagnosis    Chronic hepatitis B (HCC)     Rita Ramos, was evaluated through a synchronous (real-time) audio-video encounter. The patient (or guardian if applicable) is aware that this is a billable service, which includes applicable co-pays. This Virtual Visit was conducted with patient's (and/or legal guardian's) consent. Patient identification was verified, and a caregiver was present when appropriate.   The patient was located at Home: [address] [place] [phone]  Provider was located at Facility (Appt Dept): [address]  [place] [address]  Confirm you are appropriately licensed, registered, or certified to deliver care in the state where the patient is located as indicated above. If you are not or unsure, please re-schedule the visit: Yes, I confirm.       Rita Ramos returns to the Liver [facility] for management of chronic HBV.  The active problem list, all pertinent past medical history, medications, radiologic findings and laboratory findings related to the liver disorder were reviewed with the patient.      The patient is a 54 y.o. female who tested positive for HBV in the 1990s before she

## 2024-12-14 ENCOUNTER — HOSPITAL ENCOUNTER (OUTPATIENT)
Facility: HOSPITAL | Age: 54
Discharge: HOME | End: 2024-12-17
Payer: COMMERCIAL

## 2024-12-14 DIAGNOSIS — B18.1 CHRONIC HEPATITIS B (HCC): ICD-10-CM

## 2024-12-14 PROCEDURE — 76705 ECHO EXAM OF ABDOMEN: CPT

## 2025-01-07 DIAGNOSIS — B18.1 CHRONIC HEPATITIS B (HCC): ICD-10-CM

## 2025-01-07 DIAGNOSIS — B18.1 CHRONIC HEPATITIS B (HCC): Primary | ICD-10-CM

## 2025-06-02 ENCOUNTER — TELEMEDICINE (OUTPATIENT)
Age: 55
End: 2025-06-02

## 2025-06-02 DIAGNOSIS — B18.1 CHRONIC HEPATITIS B (HCC): ICD-10-CM

## 2025-06-02 RX ORDER — TENOFOVIR ALAFENAMIDE 25 MG/1
1 TABLET ORAL DAILY
Qty: 90 TABLET | Refills: 3 | Status: ACTIVE | OUTPATIENT
Start: 2025-06-02

## 2025-06-02 NOTE — PROGRESS NOTES
[facility] LIVER [facility]     Ron Harmon MD, FACP, MACG, FAASLD   MD Zoey Barahona PA-C April S Ashworth, [facility]-BC   Larissa Grace, [facility]  Kwame Toñito, FNP-C   Kaelyn Sravanthi, [facility]   [address]   [place]  [address] [phone]   FAX: [phone]  [facility]   [address]   [place]  [address] [phone]   FAX: [phone]       Patient Care Team:  Sulema Vincent MD as PCP - General (Family Medicine)  Yue Andrade NP.  Fax [phone]    Patient Active Problem List   Diagnosis    Chronic hepatitis B (HCC)         Rita Ramos, was evaluated through a synchronous (real-time) audio-video encounter. The patient (or guardian if applicable) is aware that this is a billable service, which includes applicable co-pays. This Virtual Visit was conducted with patient's (and/or legal guardian's) consent. Patient identification was verified, and a caregiver was present when appropriate.   The patient was located at Home: [address] [place] [phone]  Provider was located at Facility (Appt Dept): [address] [facility]  [place] [address]  Confirm you are appropriately licensed, registered, or certified to deliver care in the [facility] where the patient is located as indicated above. If you are not or unsure, please re-schedule the visit: Yes, I confirm.       Rita Ramos returns to the Liver [facility] for management of chronic HBV.  The active problem list, all pertinent past medical history, medications, radiologic findings and laboratory findings related to the liver disorder were reviewed with the patient.      The patient is a 55 y.o. female who tested positive for HBV in the 1990s before she immigrated

## 2025-06-06 ENCOUNTER — HOSPITAL ENCOUNTER (OUTPATIENT)
Facility: HOSPITAL | Age: 55
Setting detail: SPECIMEN
Discharge: HOME | End: 2025-06-09

## 2025-06-06 LAB — SENTARA SPECIMEN COLLECTION: NORMAL

## 2025-06-06 PROCEDURE — 99001 SPECIMEN HANDLING PT-LAB: CPT

## 2025-06-07 LAB
A/G RATIO: 2 RATIO (ref 1.1–2.6)
ALBUMIN: 4.8 G/DL (ref 3.5–5)
ALP BLD-CCNC: 69 U/L (ref 25–115)
ALT SERPL-CCNC: 10 U/L (ref 5–40)
ANION GAP SERPL CALCULATED.3IONS-SCNC: 14 MMOL/L (ref 3–15)
AST SERPL-CCNC: 13 U/L (ref 10–37)
BASOPHILS # BLD: 1 % (ref 0–2)
BASOPHILS ABSOLUTE: 0 K/UL (ref 0–0.2)
BILIRUB SERPL-MCNC: 0.2 MG/DL (ref 0.2–1.2)
BILIRUBIN DIRECT: <0.2 MG/DL (ref 0–0.3)
BUN BLDV-MCNC: 16 MG/DL (ref 6–22)
CALCIUM SERPL-MCNC: 9.7 MG/DL (ref 8.4–10.5)
CHLORIDE BLD-SCNC: 102 MMOL/L (ref 98–110)
CO2: 25 MMOL/L (ref 20–32)
CREAT SERPL-MCNC: 0.6 MG/DL (ref 0.5–1.2)
EOSINOPHIL # BLD: 6 % (ref 0–6)
EOSINOPHILS ABSOLUTE: 0.4 K/UL (ref 0–0.5)
GFR, ESTIMATED: >90 ML/MIN/1.73 SQ.M.
GLOBULIN: 2.4 G/DL (ref 2–4)
GLUCOSE: 90 MG/DL (ref 70–99)
HCT VFR BLD CALC: 40.9 % (ref 35.1–48)
HEMOGLOBIN: 13 G/DL (ref 11.7–16)
LYMPHOCYTES # BLD: 35 % (ref 20–45)
LYMPHOCYTES ABSOLUTE: 2.3 K/UL (ref 1–4.8)
MCH RBC QN AUTO: 30 PG (ref 26–34)
MCHC RBC AUTO-ENTMCNC: 32 G/DL (ref 31–36)
MCV RBC AUTO: 93 FL (ref 80–99)
MONOCYTES ABSOLUTE: 0.4 K/UL (ref 0.1–1)
MONOCYTES: 6 % (ref 3–12)
NEUTROPHILS ABSOLUTE: 3.4 K/UL (ref 1.8–7.7)
NEUTROPHILS SEGMENTED: 52 % (ref 40–75)
PDW BLD-RTO: 12.9 % (ref 10–15.5)
PLATELET # BLD: 270 K/UL (ref 140–440)
PMV BLD AUTO: 9.5 FL (ref 9–13)
POTASSIUM SERPL-SCNC: 4.1 MMOL/L (ref 3.5–5.5)
RBC # BLD: 4.4 M/UL (ref 3.8–5.2)
SODIUM BLD-SCNC: 141 MMOL/L (ref 133–145)
TOTAL PROTEIN: 7.2 G/DL (ref 6.4–8.3)
WBC # BLD: 6.5 K/UL (ref 4–11)

## 2025-06-08 DIAGNOSIS — B18.1 CHRONIC HEPATITIS B (HCC): Primary | ICD-10-CM

## 2025-06-10 LAB — HEPATITIS B VIRUS QUANT TND: NORMAL IU/ML

## 2025-06-13 LAB
AFP (ALPHA FETOPROTEIN) L3%: NORMAL % (ref 0.5–9.9)
AFP: 2 NG/ML (ref 1.6–4.5)

## 2025-07-21 ENCOUNTER — HOSPITAL ENCOUNTER (OUTPATIENT)
Facility: HOSPITAL | Age: 55
Discharge: HOME | End: 2025-07-24
Payer: COMMERCIAL

## 2025-07-21 DIAGNOSIS — B18.1 CHRONIC HEPATITIS B (HCC): ICD-10-CM

## 2025-07-21 PROCEDURE — 76705 ECHO EXAM OF ABDOMEN: CPT
